# Patient Record
Sex: FEMALE | Race: WHITE | NOT HISPANIC OR LATINO | Employment: OTHER | URBAN - METROPOLITAN AREA
[De-identification: names, ages, dates, MRNs, and addresses within clinical notes are randomized per-mention and may not be internally consistent; named-entity substitution may affect disease eponyms.]

---

## 2023-09-20 PROBLEM — G90.50 COMPLEX REGIONAL PAIN SYNDROME TYPE 1: Status: ACTIVE | Noted: 2023-09-20

## 2023-09-20 PROBLEM — G89.29 CHRONIC PAIN: Status: ACTIVE | Noted: 2023-09-20

## 2023-09-20 PROBLEM — I82.409 DVT (DEEP VENOUS THROMBOSIS) (MULTI): Status: ACTIVE | Noted: 2023-09-20

## 2023-09-20 PROBLEM — Z15.01 BRCA1 GENE MUTATION POSITIVE: Status: ACTIVE | Noted: 2023-09-20

## 2023-09-20 PROBLEM — C50.912: Status: ACTIVE | Noted: 2023-09-20

## 2023-09-20 PROBLEM — E11.9 DIABETES MELLITUS, TYPE 2 (MULTI): Status: ACTIVE | Noted: 2023-09-20

## 2023-09-20 PROBLEM — F32.A DEPRESSION: Status: ACTIVE | Noted: 2023-09-20

## 2023-09-20 PROBLEM — G90.50 RSD (REFLEX SYMPATHETIC DYSTROPHY): Status: ACTIVE | Noted: 2023-09-20

## 2023-09-20 PROBLEM — K21.9 ACID REFLUX: Status: ACTIVE | Noted: 2023-09-20

## 2023-09-20 PROBLEM — E53.8 VITAMIN B 12 DEFICIENCY: Status: ACTIVE | Noted: 2023-09-20

## 2023-09-20 PROBLEM — M79.7 FIBROMYALGIA: Status: ACTIVE | Noted: 2023-09-20

## 2023-09-20 PROBLEM — M47.814 SPONDYLOSIS, THORACIC: Status: ACTIVE | Noted: 2023-09-20

## 2023-09-20 PROBLEM — R92.8 ABNORMAL MAMMOGRAM OF LEFT BREAST: Status: ACTIVE | Noted: 2023-09-20

## 2023-09-20 PROBLEM — G56.30 RADIAL NERVE PALSY: Status: ACTIVE | Noted: 2023-09-20

## 2023-09-20 PROBLEM — Z15.09 BRCA1 GENE MUTATION POSITIVE: Status: ACTIVE | Noted: 2023-09-20

## 2023-09-20 PROBLEM — R14.0 BLOATING SYMPTOM: Status: ACTIVE | Noted: 2023-09-20

## 2023-09-20 PROBLEM — R59.1 LYMPHADENOPATHY: Status: ACTIVE | Noted: 2023-09-20

## 2023-09-20 RX ORDER — INSULIN GLARGINE 100 [IU]/ML
INJECTION, SOLUTION SUBCUTANEOUS
Status: ON HOLD | COMMUNITY
End: 2023-10-13 | Stop reason: ALTCHOICE

## 2023-09-20 RX ORDER — ERGOCALCIFEROL 1.25 MG/1
1 CAPSULE ORAL
Status: ON HOLD | COMMUNITY
End: 2023-10-13 | Stop reason: ALTCHOICE

## 2023-09-20 RX ORDER — MULTIVITAMIN
TABLET ORAL
Status: ON HOLD | COMMUNITY
End: 2023-10-13 | Stop reason: ALTCHOICE

## 2023-09-20 RX ORDER — NALOXONE HYDROCHLORIDE 4 MG/.1ML
4 SPRAY NASAL AS NEEDED
COMMUNITY

## 2023-09-20 RX ORDER — ROSUVASTATIN CALCIUM 5 MG/1
1 TABLET, COATED ORAL DAILY
COMMUNITY

## 2023-09-20 RX ORDER — PROMETHAZINE HYDROCHLORIDE 25 MG/1
50 TABLET ORAL 3 TIMES DAILY PRN
COMMUNITY

## 2023-09-20 RX ORDER — ASCORBIC ACID 500 MG
1 TABLET ORAL DAILY
Status: ON HOLD | COMMUNITY
End: 2023-10-13 | Stop reason: ALTCHOICE

## 2023-09-20 RX ORDER — FAMOTIDINE 10 MG/1
TABLET ORAL
Status: ON HOLD | COMMUNITY
End: 2023-10-13 | Stop reason: ALTCHOICE

## 2023-09-20 RX ORDER — BLOOD SUGAR DIAGNOSTIC
STRIP MISCELLANEOUS 4 TIMES DAILY
Status: ON HOLD | COMMUNITY
End: 2023-10-13 | Stop reason: ALTCHOICE

## 2023-09-20 RX ORDER — GABAPENTIN 250 MG/5ML
15 SOLUTION ORAL 3 TIMES DAILY
COMMUNITY
End: 2024-03-27 | Stop reason: SDUPTHER

## 2023-09-20 RX ORDER — QUETIAPINE FUMARATE 200 MG/1
TABLET, FILM COATED ORAL
Status: ON HOLD | COMMUNITY
End: 2023-10-13 | Stop reason: ALTCHOICE

## 2023-09-20 RX ORDER — NITROGLYCERIN 0.4 MG/1
TABLET SUBLINGUAL
COMMUNITY

## 2023-09-20 RX ORDER — PROMETHAZINE HYDROCHLORIDE 50 MG/1
TABLET ORAL
Status: ON HOLD | COMMUNITY
End: 2023-10-13 | Stop reason: ALTCHOICE

## 2023-09-20 RX ORDER — VALACYCLOVIR HYDROCHLORIDE 500 MG/1
TABLET, FILM COATED ORAL
COMMUNITY

## 2023-09-20 RX ORDER — DOCUSATE SODIUM 100 MG/1
1 CAPSULE, LIQUID FILLED ORAL 2 TIMES DAILY PRN
COMMUNITY

## 2023-09-20 RX ORDER — ACETAMINOPHEN 160 MG/5ML
SUSPENSION, ORAL (FINAL DOSE FORM) ORAL
Status: ON HOLD | COMMUNITY
End: 2023-10-13 | Stop reason: ALTCHOICE

## 2023-09-20 RX ORDER — LANSOPRAZOLE 30 MG/1
CAPSULE, DELAYED RELEASE ORAL
Status: ON HOLD | COMMUNITY
End: 2023-10-13 | Stop reason: ALTCHOICE

## 2023-09-20 RX ORDER — DOCUSATE SODIUM 100 MG/1
1 CAPSULE, LIQUID FILLED ORAL 2 TIMES DAILY
Status: ON HOLD | COMMUNITY
End: 2023-10-13 | Stop reason: ALTCHOICE

## 2023-09-20 RX ORDER — LEVOTHYROXINE SODIUM 25 UG/1
TABLET ORAL
Status: ON HOLD | COMMUNITY
End: 2023-10-13 | Stop reason: ALTCHOICE

## 2023-09-20 RX ORDER — METHADONE HYDROCHLORIDE 5 MG/5ML
5 SOLUTION ORAL 3 TIMES DAILY
COMMUNITY
End: 2023-12-04 | Stop reason: SDUPTHER

## 2023-09-20 RX ORDER — BACLOFEN 10 MG/1
1 TABLET ORAL 3 TIMES DAILY
Status: ON HOLD | COMMUNITY
End: 2023-10-13 | Stop reason: ALTCHOICE

## 2023-09-20 RX ORDER — SUMATRIPTAN SUCCINATE 100 MG/1
100 TABLET ORAL ONCE AS NEEDED
COMMUNITY

## 2023-09-20 RX ORDER — ALBUTEROL SULFATE 90 UG/1
2 AEROSOL, METERED RESPIRATORY (INHALATION) EVERY 4 HOURS PRN
COMMUNITY

## 2023-09-20 RX ORDER — OLMESARTAN MEDOXOMIL 20 MG/1
20 TABLET ORAL DAILY
COMMUNITY

## 2023-09-20 RX ORDER — TOPIRAMATE 50 MG/1
1 TABLET, FILM COATED ORAL 2 TIMES DAILY
COMMUNITY
End: 2023-11-28

## 2023-09-20 RX ORDER — BACLOFEN 5 MG/1
TABLET ORAL
Status: ON HOLD | COMMUNITY
End: 2023-10-13 | Stop reason: ALTCHOICE

## 2023-09-20 RX ORDER — WARFARIN 7.5 MG/1
TABLET ORAL
Status: ON HOLD | COMMUNITY
End: 2023-10-13 | Stop reason: ALTCHOICE

## 2023-10-03 ENCOUNTER — CLINICAL SUPPORT (OUTPATIENT)
Dept: PAIN MEDICINE | Facility: CLINIC | Age: 58
End: 2023-10-03
Payer: MEDICARE

## 2023-10-03 VITALS
SYSTOLIC BLOOD PRESSURE: 136 MMHG | DIASTOLIC BLOOD PRESSURE: 99 MMHG | HEART RATE: 75 BPM | OXYGEN SATURATION: 95 % | RESPIRATION RATE: 20 BRPM | TEMPERATURE: 97.7 F

## 2023-10-03 DIAGNOSIS — M47.814 THORACIC SPONDYLOSIS WITHOUT MYELOPATHY: Primary | ICD-10-CM

## 2023-10-03 LAB
INR PPP: 1.1 (ref 0.9–1.1)
PROTHROMBIN TIME: 12.8 SECONDS (ref 9.8–12.8)

## 2023-10-03 PROCEDURE — 64491 INJ PARAVERT F JNT C/T 2 LEV: CPT | Performed by: PAIN MEDICINE

## 2023-10-03 PROCEDURE — 64492 INJ PARAVERT F JNT C/T 3 LEV: CPT | Mod: 50 | Performed by: PAIN MEDICINE

## 2023-10-03 PROCEDURE — 64490 INJ PARAVERT F JNT C/T 1 LEV: CPT | Performed by: PAIN MEDICINE

## 2023-10-03 PROCEDURE — 36415 COLL VENOUS BLD VENIPUNCTURE: CPT | Performed by: PAIN MEDICINE

## 2023-10-03 PROCEDURE — 64491 INJ PARAVERT F JNT C/T 2 LEV: CPT | Mod: 50 | Performed by: PAIN MEDICINE

## 2023-10-03 PROCEDURE — 85610 PROTHROMBIN TIME: CPT | Performed by: PAIN MEDICINE

## 2023-10-03 RX ORDER — LIDOCAINE HYDROCHLORIDE 20 MG/ML
10 INJECTION, SOLUTION INFILTRATION; PERINEURAL ONCE
Status: DISCONTINUED | OUTPATIENT
Start: 2023-10-03 | End: 2024-01-30 | Stop reason: HOSPADM

## 2023-10-03 RX ORDER — LIDOCAINE HYDROCHLORIDE 10 MG/ML
10 INJECTION INFILTRATION; PERINEURAL ONCE
Status: DISCONTINUED | OUTPATIENT
Start: 2023-10-03 | End: 2024-01-30 | Stop reason: HOSPADM

## 2023-10-03 ASSESSMENT — PAIN SCALES - GENERAL: PAINLEVEL_OUTOF10: 6

## 2023-10-03 ASSESSMENT — PAIN - FUNCTIONAL ASSESSMENT: PAIN_FUNCTIONAL_ASSESSMENT: 0-10

## 2023-10-03 NOTE — PROGRESS NOTES
Patient ID: Rut Tapia is a 57 y.o. female.    Procedures  Clinical Note  The patient is here today to receive diagnostic medial nerve branch block T10-T11, T11-T12 bilaterally to assist with pain.    Procedure Note  The patient was taken to the procedure room, was placed into a prone position. The area was prepped and draped sterilely in the normal fashion. The patient was throughout the procedure monitored by the nursing staff. The skin and subcutaneous tissue was anesthetized with 1% lidocaine. Then 22-gauge spinal needles were introduced into the approximation of the medial nerve branches at the above mentioned level, confirmed in AP and oblique view with negative aspiration of heme and CSF. The patient received 0.5 mL of 2% lidocaine per site. The patient tolerated the procedure well, was taken to the recovery room, allowed to recover for sufficient amount of time and then was discharged home in stable condition. The patient was advised to followup with the [facility] to reassess the improvement and determine the need for the radiofrequency ablation.    Thank you for allowing me to participate in the care of this patient.

## 2023-10-03 NOTE — PROGRESS NOTES
Subjective   Rut Tapia is a 57 y.o. female who presents for evaluation of her *** pain.  The pain is located in the *** area and radiates to ***.  The pain started *** ago and symptoms have been ***. She underwent ***. Symptoms interfere with {PAIN INTERFERES (Optional):10215} pain is described as {PAIN CHARACTER (Optional):55288} and is rated as {NUMBERS 1-10:97133} on a scale of 1-10. The pain is described as {PAIN ASSESSMENT (Optional):30118} with a score of {NUMBERS 1-10:28190} on her best day and a score of {NUMBERS 1-10:61884} on her worst day. Pain is relieved by ***. Prior pain treatment has included ***. Rut reports {GO[place] (Optional):28246} sleep habits and reports {NUMBERS 1-10:68731} hours of uninterrupted sleep per night. She {SLEEP (Optional):00797} and reports {MOOD (Optional):35696}. Imaging studies have included {IMAGING STUDIES (Optional):14125}.     Past Medical History:   Diagnosis Date    Chronic vascular disorders of intestine (CMS/HCC)     SMAS (superior mesenteric artery syndrome)    Narcolepsy without cataplexy     Narcolepsy    Personal history of other diseases of the nervous system and sense organs     History of cataract    Personal history of other infectious and parasitic diseases     History of staph infection    Personal history of other mental and behavioral disorders     History of dementia    Personal history of other venous thrombosis and embolism     History of deep venous thrombosis    Personal history of urinary calculi     History of kidney stones    Type 2 diabetes mellitus with diabetic neuropathy, unspecified (CMS/HCC)     Diabetes mellitus with diabetic neuropathy     Past Surgical History:   Procedure Laterality Date    APPENDECTOMY  2022    Appendectomy     SECTION, CLASSIC  2013     Section    CHOLECYSTECTOMY  2022    Cholecystectomy    GASTRIC BYPASS  2013    Gastric Surgery For Morbid Obesity Gastric Bypass     HYSTERECTOMY  2022    Hysterectomy    OOPHORECTOMY  2013    Oophorectomy    OTHER SURGICAL HISTORY  2022    Hernia repair    OTHER SURGICAL HISTORY  10/14/2022    Dilation and curettage    OTHER SURGICAL HISTORY  10/14/2022    Laminectomy    OTHER SURGICAL HISTORY  10/14/2022    Spinal cord stimulation    OTHER SURGICAL HISTORY  10/14/2022    Liver biopsy    OTHER SURGICAL HISTORY  10/14/2022    Cardiac catheterization    OTHER SURGICAL HISTORY  2022    Mastectomy bilateral    OTHER SURGICAL HISTORY  2022     section    OTHER SURGICAL HISTORY  2022    Gastric bypass surgery    OTHER SURGICAL HISTORY  2021    Breast biopsy    OTHER SURGICAL HISTORY  2021    Lymphatic Surgery    OTHER SURGICAL HISTORY  2019    Hysterectomy    OTHER SURGICAL HISTORY  2019    Cholecystectomy    OTHER SURGICAL HISTORY  2019    Knee surgery       I have reviewed the nurses notes and am aware of family/social history.     Review of Systems    Objective   Physical Exam    ASSESSMENT: ***    PLAN: ***  Discussed treatment plan with patient.   Call or return to clinic prn if these symptoms worsen or fail to improve as anticipated.     Yani Orozco RN

## 2023-10-05 ENCOUNTER — TELEPHONE (OUTPATIENT)
Dept: PAIN MEDICINE | Facility: CLINIC | Age: 58
End: 2023-10-05
Payer: MEDICARE

## 2023-10-07 ENCOUNTER — APPOINTMENT (OUTPATIENT)
Dept: RADIOLOGY | Facility: HOSPITAL | Age: 58
DRG: 603 | End: 2023-10-07
Payer: MEDICARE

## 2023-10-07 ENCOUNTER — HOSPITAL ENCOUNTER (INPATIENT)
Facility: HOSPITAL | Age: 58
LOS: 2 days | Discharge: HOME HEALTH | DRG: 603 | End: 2023-10-14
Attending: STUDENT IN AN ORGANIZED HEALTH CARE EDUCATION/TRAINING PROGRAM
Payer: MEDICARE
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
## 2023-10-07 DIAGNOSIS — S81.852A CAT BITE OF LEFT LOWER LEG, INITIAL ENCOUNTER: ICD-10-CM

## 2023-10-07 DIAGNOSIS — M79.89 SWELLING OF LOWER LEG: ICD-10-CM

## 2023-10-07 DIAGNOSIS — R59.1 LYMPHADENOPATHY: ICD-10-CM

## 2023-10-07 DIAGNOSIS — L03.116 CELLULITIS OF LEFT LOWER EXTREMITY: ICD-10-CM

## 2023-10-07 LAB
ALBUMIN SERPL BCP-MCNC: 4.2 G/DL (ref 3.4–5)
ALP SERPL-CCNC: 93 U/L (ref 33–110)
ALT SERPL W P-5'-P-CCNC: 15 U/L (ref 7–45)
ANION GAP SERPL CALC-SCNC: 12 MMOL/L (ref 10–20)
AST SERPL W P-5'-P-CCNC: 15 U/L (ref 9–39)
BASOPHILS # BLD AUTO: 0.02 X10*3/UL (ref 0–0.1)
BASOPHILS NFR BLD AUTO: 0.3 %
BILIRUB SERPL-MCNC: 0.5 MG/DL (ref 0–1.2)
BUN SERPL-MCNC: 20 MG/DL (ref 6–23)
CALCIUM SERPL-MCNC: 9.2 MG/DL (ref 8.6–10.3)
CHLORIDE SERPL-SCNC: 105 MMOL/L (ref 98–107)
CO2 SERPL-SCNC: 25 MMOL/L (ref 21–32)
CREAT SERPL-MCNC: 0.88 MG/DL (ref 0.5–1.05)
EOSINOPHIL # BLD AUTO: 0.16 X10*3/UL (ref 0–0.7)
EOSINOPHIL NFR BLD AUTO: 2.4 %
ERYTHROCYTE [DISTWIDTH] IN BLOOD BY AUTOMATED COUNT: 15 % (ref 11.5–14.5)
GFR SERPL CREATININE-BSD FRML MDRD: 77 ML/MIN/1.73M*2
GLUCOSE BLD MANUAL STRIP-MCNC: 138 MG/DL (ref 74–99)
GLUCOSE SERPL-MCNC: 141 MG/DL (ref 74–99)
HCT VFR BLD AUTO: 36 % (ref 36–46)
HGB BLD-MCNC: 11 G/DL (ref 12–16)
IMM GRANULOCYTES # BLD AUTO: 0.01 X10*3/UL (ref 0–0.7)
IMM GRANULOCYTES NFR BLD AUTO: 0.2 % (ref 0–0.9)
LACTATE SERPL-SCNC: 0.7 MMOL/L (ref 0.4–2)
LYMPHOCYTES # BLD AUTO: 1.98 X10*3/UL (ref 1.2–4.8)
LYMPHOCYTES NFR BLD AUTO: 29.8 %
MCH RBC QN AUTO: 26.9 PG (ref 26–34)
MCHC RBC AUTO-ENTMCNC: 30.6 G/DL (ref 32–36)
MCV RBC AUTO: 88 FL (ref 80–100)
MONOCYTES # BLD AUTO: 0.39 X10*3/UL (ref 0.1–1)
MONOCYTES NFR BLD AUTO: 5.9 %
NEUTROPHILS # BLD AUTO: 4.08 X10*3/UL (ref 1.2–7.7)
NEUTROPHILS NFR BLD AUTO: 61.4 %
NRBC BLD-RTO: 0 /100 WBCS (ref 0–0)
PLATELET # BLD AUTO: 216 X10*3/UL (ref 150–450)
PMV BLD AUTO: 10 FL (ref 7.5–11.5)
POTASSIUM SERPL-SCNC: 3.7 MMOL/L (ref 3.5–5.3)
PROT SERPL-MCNC: 6.9 G/DL (ref 6.4–8.2)
RBC # BLD AUTO: 4.09 X10*6/UL (ref 4–5.2)
SODIUM SERPL-SCNC: 138 MMOL/L (ref 136–145)
WBC # BLD AUTO: 6.6 X10*3/UL (ref 4.4–11.3)

## 2023-10-07 PROCEDURE — G0378 HOSPITAL OBSERVATION PER HR: HCPCS

## 2023-10-07 PROCEDURE — 87040 BLOOD CULTURE FOR BACTERIA: CPT | Mod: CMCLAB,PARLAB | Performed by: STUDENT IN AN ORGANIZED HEALTH CARE EDUCATION/TRAINING PROGRAM

## 2023-10-07 PROCEDURE — 85025 COMPLETE CBC W/AUTO DIFF WBC: CPT | Performed by: STUDENT IN AN ORGANIZED HEALTH CARE EDUCATION/TRAINING PROGRAM

## 2023-10-07 PROCEDURE — 73590 X-RAY EXAM OF LOWER LEG: CPT | Mod: LT,FY,FR

## 2023-10-07 PROCEDURE — 36415 COLL VENOUS BLD VENIPUNCTURE: CPT | Performed by: STUDENT IN AN ORGANIZED HEALTH CARE EDUCATION/TRAINING PROGRAM

## 2023-10-07 PROCEDURE — 99285 EMERGENCY DEPT VISIT HI MDM: CPT | Mod: 25 | Performed by: STUDENT IN AN ORGANIZED HEALTH CARE EDUCATION/TRAINING PROGRAM

## 2023-10-07 PROCEDURE — 96365 THER/PROPH/DIAG IV INF INIT: CPT

## 2023-10-07 PROCEDURE — 82947 ASSAY GLUCOSE BLOOD QUANT: CPT

## 2023-10-07 PROCEDURE — 2500000004 HC RX 250 GENERAL PHARMACY W/ HCPCS (ALT 636 FOR OP/ED): Performed by: STUDENT IN AN ORGANIZED HEALTH CARE EDUCATION/TRAINING PROGRAM

## 2023-10-07 PROCEDURE — 87075 CULTR BACTERIA EXCEPT BLOOD: CPT | Mod: CMCLAB,PARLAB | Performed by: STUDENT IN AN ORGANIZED HEALTH CARE EDUCATION/TRAINING PROGRAM

## 2023-10-07 PROCEDURE — 73590 X-RAY EXAM OF LOWER LEG: CPT | Mod: LEFT SIDE | Performed by: RADIOLOGY

## 2023-10-07 PROCEDURE — 80053 COMPREHEN METABOLIC PANEL: CPT | Performed by: STUDENT IN AN ORGANIZED HEALTH CARE EDUCATION/TRAINING PROGRAM

## 2023-10-07 PROCEDURE — 83605 ASSAY OF LACTIC ACID: CPT | Performed by: STUDENT IN AN ORGANIZED HEALTH CARE EDUCATION/TRAINING PROGRAM

## 2023-10-07 RX ORDER — METRONIDAZOLE 500 MG/100ML
500 INJECTION, SOLUTION INTRAVENOUS ONCE
Status: COMPLETED | OUTPATIENT
Start: 2023-10-07 | End: 2023-10-07

## 2023-10-07 RX ORDER — CEFEPIME 1 G/50ML
2 INJECTION, SOLUTION INTRAVENOUS ONCE
Status: COMPLETED | OUTPATIENT
Start: 2023-10-07 | End: 2023-10-07

## 2023-10-07 RX ADMIN — VANCOMYCIN HYDROCHLORIDE 1750 MG: 10 INJECTION, POWDER, LYOPHILIZED, FOR SOLUTION INTRAVENOUS at 22:43

## 2023-10-07 RX ADMIN — CEFEPIME 2 G: 1 INJECTION, SOLUTION INTRAVENOUS at 21:38

## 2023-10-07 RX ADMIN — METRONIDAZOLE 500 MG: 500 INJECTION, SOLUTION INTRAVENOUS at 22:11

## 2023-10-07 ASSESSMENT — LIFESTYLE VARIABLES
HAVE PEOPLE ANNOYED YOU BY CRITICIZING YOUR DRINKING: NO
HAVE YOU EVER FELT YOU SHOULD CUT DOWN ON YOUR DRINKING: NO
EVER FELT BAD OR GUILTY ABOUT YOUR DRINKING: NO
EVER HAD A DRINK FIRST THING IN THE MORNING TO STEADY YOUR NERVES TO GET RID OF A HANGOVER: NO

## 2023-10-07 ASSESSMENT — PAIN SCALES - GENERAL: PAINLEVEL_OUTOF10: 7

## 2023-10-07 ASSESSMENT — COLUMBIA-SUICIDE SEVERITY RATING SCALE - C-SSRS
2. HAVE YOU ACTUALLY HAD ANY THOUGHTS OF KILLING YOURSELF?: NO
6. HAVE YOU EVER DONE ANYTHING, STARTED TO DO ANYTHING, OR PREPARED TO DO ANYTHING TO END YOUR LIFE?: NO
1. IN THE PAST MONTH, HAVE YOU WISHED YOU WERE DEAD OR WISHED YOU COULD GO TO SLEEP AND NOT WAKE UP?: NO

## 2023-10-07 ASSESSMENT — PAIN - FUNCTIONAL ASSESSMENT: PAIN_FUNCTIONAL_ASSESSMENT: 0-10

## 2023-10-08 LAB
ALBUMIN SERPL BCP-MCNC: 4.1 G/DL (ref 3.4–5)
ALP SERPL-CCNC: 88 U/L (ref 33–110)
ALT SERPL W P-5'-P-CCNC: 14 U/L (ref 7–45)
ANION GAP SERPL CALC-SCNC: 11 MMOL/L (ref 10–20)
APPEARANCE UR: CLEAR
AST SERPL W P-5'-P-CCNC: 15 U/L (ref 9–39)
BILIRUB SERPL-MCNC: 0.3 MG/DL (ref 0–1.2)
BILIRUB UR STRIP.AUTO-MCNC: NEGATIVE MG/DL
BUN SERPL-MCNC: 16 MG/DL (ref 6–23)
CALCIUM SERPL-MCNC: 8.8 MG/DL (ref 8.6–10.3)
CHLORIDE SERPL-SCNC: 108 MMOL/L (ref 98–107)
CO2 SERPL-SCNC: 25 MMOL/L (ref 21–32)
COLOR UR: YELLOW
CREAT SERPL-MCNC: 0.89 MG/DL (ref 0.5–1.05)
CRP SERPL-MCNC: 3.39 MG/DL
ERYTHROCYTE [SEDIMENTATION RATE] IN BLOOD BY WESTERGREN METHOD: 34 MM/H (ref 0–30)
GFR SERPL CREATININE-BSD FRML MDRD: 76 ML/MIN/1.73M*2
GLUCOSE BLD MANUAL STRIP-MCNC: 121 MG/DL (ref 74–99)
GLUCOSE SERPL-MCNC: 119 MG/DL (ref 74–99)
GLUCOSE UR STRIP.AUTO-MCNC: NEGATIVE MG/DL
INR PPP: 1.4 (ref 0.9–1.1)
KETONES UR STRIP.AUTO-MCNC: NEGATIVE MG/DL
LEUKOCYTE ESTERASE UR QL STRIP.AUTO: NEGATIVE
NITRITE UR QL STRIP.AUTO: NEGATIVE
PH UR STRIP.AUTO: 5 [PH]
POTASSIUM SERPL-SCNC: 4.2 MMOL/L (ref 3.5–5.3)
PROT SERPL-MCNC: 6.6 G/DL (ref 6.4–8.2)
PROT UR STRIP.AUTO-MCNC: NEGATIVE MG/DL
PROTHROMBIN TIME: 16.1 SECONDS (ref 9.8–12.8)
RBC # UR STRIP.AUTO: NEGATIVE /UL
SODIUM SERPL-SCNC: 140 MMOL/L (ref 136–145)
SP GR UR STRIP.AUTO: 1.01
UROBILINOGEN UR STRIP.AUTO-MCNC: <2 MG/DL
VANCOMYCIN TROUGH SERPL-MCNC: 12.6 UG/ML (ref 5–20)

## 2023-10-08 PROCEDURE — 86140 C-REACTIVE PROTEIN: CPT | Performed by: INTERNAL MEDICINE

## 2023-10-08 PROCEDURE — 36415 COLL VENOUS BLD VENIPUNCTURE: CPT | Performed by: INTERNAL MEDICINE

## 2023-10-08 PROCEDURE — 99222 1ST HOSP IP/OBS MODERATE 55: CPT | Performed by: NURSE PRACTITIONER

## 2023-10-08 PROCEDURE — 85610 PROTHROMBIN TIME: CPT | Performed by: INTERNAL MEDICINE

## 2023-10-08 PROCEDURE — 99222 1ST HOSP IP/OBS MODERATE 55: CPT | Performed by: INTERNAL MEDICINE

## 2023-10-08 PROCEDURE — 80202 ASSAY OF VANCOMYCIN: CPT | Performed by: INTERNAL MEDICINE

## 2023-10-08 PROCEDURE — 2500000004 HC RX 250 GENERAL PHARMACY W/ HCPCS (ALT 636 FOR OP/ED): Performed by: INTERNAL MEDICINE

## 2023-10-08 PROCEDURE — 96372 THER/PROPH/DIAG INJ SC/IM: CPT | Performed by: INTERNAL MEDICINE

## 2023-10-08 PROCEDURE — 81003 URINALYSIS AUTO W/O SCOPE: CPT | Performed by: INTERNAL MEDICINE

## 2023-10-08 PROCEDURE — G0378 HOSPITAL OBSERVATION PER HR: HCPCS

## 2023-10-08 PROCEDURE — 80053 COMPREHEN METABOLIC PANEL: CPT | Performed by: INTERNAL MEDICINE

## 2023-10-08 PROCEDURE — 82947 ASSAY GLUCOSE BLOOD QUANT: CPT

## 2023-10-08 PROCEDURE — 2500000001 HC RX 250 WO HCPCS SELF ADMINISTERED DRUGS (ALT 637 FOR MEDICARE OP): Performed by: INTERNAL MEDICINE

## 2023-10-08 PROCEDURE — 85652 RBC SED RATE AUTOMATED: CPT | Performed by: INTERNAL MEDICINE

## 2023-10-08 RX ORDER — DEXTROSE 50 % IN WATER (D50W) INTRAVENOUS SYRINGE
25
Status: DISCONTINUED | OUTPATIENT
Start: 2023-10-08 | End: 2023-10-15 | Stop reason: HOSPADM

## 2023-10-08 RX ORDER — DOCUSATE SODIUM 100 MG/1
100 CAPSULE, LIQUID FILLED ORAL 2 TIMES DAILY
Status: DISCONTINUED | OUTPATIENT
Start: 2023-10-08 | End: 2023-10-15 | Stop reason: HOSPADM

## 2023-10-08 RX ORDER — DEXTROSE MONOHYDRATE 100 MG/ML
0.3 INJECTION, SOLUTION INTRAVENOUS ONCE AS NEEDED
Status: DISCONTINUED | OUTPATIENT
Start: 2023-10-08 | End: 2023-10-15 | Stop reason: HOSPADM

## 2023-10-08 RX ORDER — BISMUTH SUBSALICYLATE 262 MG
1 TABLET,CHEWABLE ORAL DAILY
Status: DISCONTINUED | OUTPATIENT
Start: 2023-10-08 | End: 2023-10-13

## 2023-10-08 RX ORDER — QUETIAPINE FUMARATE 100 MG/1
200 TABLET, FILM COATED ORAL 2 TIMES DAILY
Status: DISCONTINUED | OUTPATIENT
Start: 2023-10-08 | End: 2023-10-08

## 2023-10-08 RX ORDER — ACETAMINOPHEN 325 MG/1
650 TABLET ORAL EVERY 6 HOURS PRN
Status: DISCONTINUED | OUTPATIENT
Start: 2023-10-08 | End: 2023-10-15 | Stop reason: HOSPADM

## 2023-10-08 RX ORDER — MORPHINE SULFATE 2 MG/ML
2 INJECTION, SOLUTION INTRAMUSCULAR; INTRAVENOUS EVERY 4 HOURS PRN
Status: DISCONTINUED | OUTPATIENT
Start: 2023-10-08 | End: 2023-10-15 | Stop reason: HOSPADM

## 2023-10-08 RX ORDER — CALCIUM CARBONATE 200(500)MG
500 TABLET,CHEWABLE ORAL DAILY
Status: DISCONTINUED | OUTPATIENT
Start: 2023-10-08 | End: 2023-10-15 | Stop reason: HOSPADM

## 2023-10-08 RX ORDER — FAMOTIDINE 20 MG/1
20 TABLET, FILM COATED ORAL 2 TIMES DAILY
Status: DISCONTINUED | OUTPATIENT
Start: 2023-10-08 | End: 2023-10-15 | Stop reason: HOSPADM

## 2023-10-08 RX ORDER — PROMETHAZINE HYDROCHLORIDE 25 MG/1
25 TABLET ORAL 2 TIMES DAILY PRN
Status: DISCONTINUED | OUTPATIENT
Start: 2023-10-08 | End: 2023-10-15 | Stop reason: HOSPADM

## 2023-10-08 RX ORDER — QUETIAPINE FUMARATE 100 MG/1
200 TABLET, FILM COATED ORAL NIGHTLY
Status: DISCONTINUED | OUTPATIENT
Start: 2023-10-08 | End: 2023-10-15 | Stop reason: HOSPADM

## 2023-10-08 RX ORDER — VANCOMYCIN HYDROCHLORIDE 750 MG/150ML
750 INJECTION, SOLUTION INTRAVENOUS EVERY 12 HOURS
Status: DISCONTINUED | OUTPATIENT
Start: 2023-10-08 | End: 2023-10-10

## 2023-10-08 RX ORDER — ENOXAPARIN SODIUM 100 MG/ML
40 INJECTION SUBCUTANEOUS EVERY 12 HOURS SCHEDULED
Status: DISCONTINUED | OUTPATIENT
Start: 2023-10-08 | End: 2023-10-12

## 2023-10-08 RX ORDER — GABAPENTIN 250 MG/5ML
750 SOLUTION ORAL EVERY 8 HOURS SCHEDULED
Status: DISCONTINUED | OUTPATIENT
Start: 2023-10-08 | End: 2023-10-15 | Stop reason: HOSPADM

## 2023-10-08 RX ORDER — CEFEPIME 1 G/50ML
2 INJECTION, SOLUTION INTRAVENOUS EVERY 8 HOURS
Status: DISCONTINUED | OUTPATIENT
Start: 2023-10-08 | End: 2023-10-10

## 2023-10-08 RX ORDER — LANOLIN ALCOHOL/MO/W.PET/CERES
500 CREAM (GRAM) TOPICAL DAILY
Status: DISCONTINUED | OUTPATIENT
Start: 2023-10-08 | End: 2023-10-15 | Stop reason: HOSPADM

## 2023-10-08 RX ORDER — NITROGLYCERIN 0.4 MG/1
0.4 TABLET SUBLINGUAL EVERY 5 MIN PRN
Status: DISCONTINUED | OUTPATIENT
Start: 2023-10-08 | End: 2023-10-15 | Stop reason: HOSPADM

## 2023-10-08 RX ORDER — ARIPIPRAZOLE 2 MG/1
2 TABLET ORAL DAILY
Status: DISCONTINUED | OUTPATIENT
Start: 2023-10-08 | End: 2023-10-15 | Stop reason: HOSPADM

## 2023-10-08 RX ORDER — METRONIDAZOLE 500 MG/100ML
500 INJECTION, SOLUTION INTRAVENOUS EVERY 8 HOURS
Status: DISCONTINUED | OUTPATIENT
Start: 2023-10-08 | End: 2023-10-12

## 2023-10-08 RX ORDER — BACLOFEN 10 MG/1
10 TABLET ORAL 3 TIMES DAILY
Status: DISCONTINUED | OUTPATIENT
Start: 2023-10-08 | End: 2023-10-15 | Stop reason: HOSPADM

## 2023-10-08 RX ORDER — ASCORBIC ACID 250 MG
500 TABLET ORAL DAILY
Status: DISCONTINUED | OUTPATIENT
Start: 2023-10-08 | End: 2023-10-15 | Stop reason: HOSPADM

## 2023-10-08 RX ORDER — INSULIN LISPRO 100 [IU]/ML
0-5 INJECTION, SOLUTION INTRAVENOUS; SUBCUTANEOUS
Status: DISCONTINUED | OUTPATIENT
Start: 2023-10-09 | End: 2023-10-15 | Stop reason: HOSPADM

## 2023-10-08 RX ADMIN — ARIPIPRAZOLE 2 MG: 2 TABLET ORAL at 11:57

## 2023-10-08 RX ADMIN — ENOXAPARIN SODIUM 40 MG: 40 INJECTION SUBCUTANEOUS at 09:09

## 2023-10-08 RX ADMIN — GABAPENTIN 750 MG: 250 SOLUTION ORAL at 15:12

## 2023-10-08 RX ADMIN — MORPHINE SULFATE 2 MG: 2 INJECTION, SOLUTION INTRAMUSCULAR; INTRAVENOUS at 05:50

## 2023-10-08 RX ADMIN — VANCOMYCIN HYDROCHLORIDE 750 MG: 750 INJECTION, SOLUTION INTRAVENOUS at 10:37

## 2023-10-08 RX ADMIN — BACLOFEN 10 MG: 10 TABLET ORAL at 21:22

## 2023-10-08 RX ADMIN — CEFEPIME 2 G: 1 INJECTION, SOLUTION INTRAVENOUS at 06:56

## 2023-10-08 RX ADMIN — MORPHINE SULFATE 2 MG: 2 INJECTION, SOLUTION INTRAMUSCULAR; INTRAVENOUS at 13:16

## 2023-10-08 RX ADMIN — BACLOFEN 10 MG: 10 TABLET ORAL at 10:39

## 2023-10-08 RX ADMIN — METRONIDAZOLE 500 MG: 500 INJECTION, SOLUTION INTRAVENOUS at 15:17

## 2023-10-08 RX ADMIN — METRONIDAZOLE 500 MG: 500 INJECTION, SOLUTION INTRAVENOUS at 05:50

## 2023-10-08 RX ADMIN — ACETAMINOPHEN 650 MG: 325 TABLET ORAL at 01:29

## 2023-10-08 RX ADMIN — QUETIAPINE FUMARATE 200 MG: 100 TABLET ORAL at 21:22

## 2023-10-08 RX ADMIN — PROMETHAZINE HYDROCHLORIDE 25 MG: 25 TABLET ORAL at 10:34

## 2023-10-08 RX ADMIN — FAMOTIDINE 20 MG: 20 TABLET, FILM COATED ORAL at 21:23

## 2023-10-08 RX ADMIN — ENOXAPARIN SODIUM 40 MG: 40 INJECTION SUBCUTANEOUS at 21:22

## 2023-10-08 RX ADMIN — CEFEPIME 2 G: 1 INJECTION, SOLUTION INTRAVENOUS at 22:41

## 2023-10-08 RX ADMIN — BACLOFEN 10 MG: 10 TABLET ORAL at 15:15

## 2023-10-08 RX ADMIN — VANCOMYCIN HYDROCHLORIDE 750 MG: 750 INJECTION, SOLUTION INTRAVENOUS at 23:28

## 2023-10-08 RX ADMIN — WARFARIN SODIUM 7.5 MG: 5 TABLET ORAL at 17:50

## 2023-10-08 RX ADMIN — THIAMINE HCL TAB 100 MG 500 MG: 100 TAB at 10:36

## 2023-10-08 RX ADMIN — CEFEPIME 2 G: 1 INJECTION, SOLUTION INTRAVENOUS at 13:16

## 2023-10-08 RX ADMIN — GABAPENTIN 750 MG: 250 SOLUTION ORAL at 21:33

## 2023-10-08 RX ADMIN — MORPHINE SULFATE 2 MG: 2 INJECTION, SOLUTION INTRAMUSCULAR; INTRAVENOUS at 22:48

## 2023-10-08 RX ADMIN — FAMOTIDINE 20 MG: 20 TABLET, FILM COATED ORAL at 10:33

## 2023-10-08 RX ADMIN — METRONIDAZOLE 500 MG: 500 INJECTION, SOLUTION INTRAVENOUS at 21:39

## 2023-10-08 SDOH — ECONOMIC STABILITY: HOUSING INSECURITY

## 2023-10-08 SDOH — ECONOMIC STABILITY: TRANSPORTATION INSECURITY
IN THE PAST 12 MONTHS, HAS THE LACK OF TRANSPORTATION KEPT YOU FROM MEDICAL APPOINTMENTS OR FROM GETTING MEDICATIONS?: PATIENT DECLINED

## 2023-10-08 SDOH — ECONOMIC STABILITY: FOOD INSECURITY: WITHIN THE PAST 12 MONTHS, YOU WORRIED THAT YOUR FOOD WOULD RUN OUT BEFORE YOU GOT MONEY TO BUY MORE.: PATIENT DECLINED

## 2023-10-08 SDOH — SOCIAL STABILITY: SOCIAL INSECURITY: ARE THERE ANY APPARENT SIGNS OF INJURIES/BEHAVIORS THAT COULD BE RELATED TO ABUSE/NEGLECT?: NO

## 2023-10-08 SDOH — ECONOMIC STABILITY: FOOD INSECURITY

## 2023-10-08 SDOH — ECONOMIC STABILITY: HOUSING INSECURITY
IN THE LAST 12 MONTHS, WAS THERE A TIME WHEN YOU DID NOT HAVE A STEADY PLACE TO SLEEP OR SLEPT IN A SHELTER (INCLUDING NOW)?: NO

## 2023-10-08 SDOH — SOCIAL STABILITY: SOCIAL INSECURITY: ARE YOU OR HAVE YOU BEEN THREATENED OR ABUSED PHYSICALLY, EMOTIONALLY, OR SEXUALLY BY ANYONE?: NO

## 2023-10-08 SDOH — ECONOMIC STABILITY: HOUSING INSECURITY: IN THE LAST 12 MONTHS, WAS THERE A TIME WHEN YOU WERE NOT ABLE TO PAY THE MORTGAGE OR RENT ON TIME?: PATIENT REFUSED

## 2023-10-08 SDOH — ECONOMIC STABILITY: INCOME INSECURITY: IN THE LAST 12 MONTHS, WAS THERE A TIME WHEN YOU WERE NOT ABLE TO PAY THE MORTGAGE OR RENT ON TIME?: NO

## 2023-10-08 SDOH — ECONOMIC STABILITY: TRANSPORTATION INSECURITY
IN THE PAST 12 MONTHS, HAS LACK OF TRANSPORTATION KEPT YOU FROM MEETINGS, WORK, OR FROM GETTING THINGS NEEDED FOR DAILY LIVING?: PATIENT DECLINED

## 2023-10-08 SDOH — ECONOMIC STABILITY: FOOD INSECURITY: WITHIN THE PAST 12 MONTHS, THE FOOD YOU BOUGHT JUST DIDN'T LAST AND YOU DIDN'T HAVE MONEY TO GET MORE.: PATIENT DECLINED

## 2023-10-08 SDOH — ECONOMIC STABILITY: FOOD INSECURITY
WITHIN THE PAST 12 MONTHS, YOU WORRIED THAT YOUR FOOD WOULD RUN OUT BEFORE YOU GOT THE MONEY TO BUY MORE.: PATIENT DECLINED

## 2023-10-08 SDOH — ECONOMIC STABILITY: FOOD INSECURITY: WITHIN THE PAST 12 MONTHS, THE FOOD YOU BOUGHT JUST DIDN'T LAST AND YOU DIDN'T HAVE MONEY TO GET MORE.: NEVER TRUE

## 2023-10-08 SDOH — ECONOMIC STABILITY: TRANSPORTATION INSECURITY
IN THE PAST 12 MONTHS, HAS LACK OF TRANSPORTATION KEPT YOU FROM MEETINGS, WORK, OR FROM GETTING THINGS NEEDED FOR DAILY LIVING?: YES

## 2023-10-08 SDOH — SOCIAL STABILITY: SOCIAL INSECURITY: HAS ANYONE EVER THREATENED TO HURT YOUR FAMILY OR YOUR PETS?: NO

## 2023-10-08 SDOH — SOCIAL STABILITY: SOCIAL INSECURITY: HAVE YOU HAD THOUGHTS OF HARMING ANYONE ELSE?: NO

## 2023-10-08 SDOH — HEALTH STABILITY: PHYSICAL HEALTH: ON AVERAGE, HOW MANY DAYS PER WEEK DO YOU ENGAGE IN MODERATE TO STRENUOUS EXERCISE (LIKE A BRISK WALK)?: 7 DAYS

## 2023-10-08 SDOH — ECONOMIC STABILITY: TRANSPORTATION INSECURITY
IN THE PAST 12 MONTHS, HAS THE LACK OF TRANSPORTATION KEPT YOU FROM MEDICAL APPOINTMENTS OR FROM GETTING MEDICATIONS?: YES

## 2023-10-08 SDOH — HEALTH STABILITY: MENTAL HEALTH
STRESS IS WHEN SOMEONE FEELS TENSE, NERVOUS, ANXIOUS, OR CAN'T SLEEP AT NIGHT BECAUSE THEIR MIND IS TROUBLED. HOW STRESSED ARE YOU?: TO SOME EXTENT

## 2023-10-08 SDOH — ECONOMIC STABILITY: TRANSPORTATION INSECURITY: IN THE PAST 12 MONTHS, HAS LACK OF TRANSPORTATION KEPT YOU FROM MEDICAL APPOINTMENTS OR FROM GETTING MEDICATIONS?: YES

## 2023-10-08 SDOH — ECONOMIC STABILITY: FOOD INSECURITY: WITHIN THE PAST 12 MONTHS, YOU WORRIED THAT YOUR FOOD WOULD RUN OUT BEFORE YOU GOT MONEY TO BUY MORE.: NEVER TRUE

## 2023-10-08 SDOH — ECONOMIC STABILITY: GENERAL

## 2023-10-08 SDOH — ECONOMIC STABILITY: INCOME INSECURITY: HOW HARD IS IT FOR YOU TO PAY FOR THE VERY BASICS LIKE FOOD, HOUSING, MEDICAL CARE, AND HEATING?: PATIENT DECLINED

## 2023-10-08 SDOH — SOCIAL STABILITY: SOCIAL INSECURITY: DO YOU FEEL UNSAFE GOING BACK TO THE PLACE WHERE YOU ARE LIVING?: NO

## 2023-10-08 SDOH — HEALTH STABILITY: PHYSICAL HEALTH: ON AVERAGE, HOW MANY MINUTES DO YOU ENGAGE IN EXERCISE AT THIS LEVEL?: 30 MIN

## 2023-10-08 SDOH — ECONOMIC STABILITY: HOUSING INSECURITY: IN THE PAST 12 MONTHS HAS THE ELECTRIC, GAS, OIL, OR WATER COMPANY THREATENED TO SHUT OFF SERVICES IN YOUR HOME?: NO

## 2023-10-08 SDOH — ECONOMIC STABILITY: INCOME INSECURITY: IN THE LAST 12 MONTHS, WAS THERE A TIME WHEN YOU WERE NOT ABLE TO PAY THE MORTGAGE OR RENT ON TIME?: PATIENT REFUSED

## 2023-10-08 SDOH — ECONOMIC STABILITY: FOOD INSECURITY: WITHIN THE PAST 12 MONTHS, THE FOOD YOU BOUGHT JUST DIDN’T LAST AND YOU DIDN’T HAVE MONEY TO GET MORE.: PATIENT DECLINED

## 2023-10-08 SDOH — SOCIAL STABILITY: SOCIAL INSECURITY: DO YOU FEEL ANYONE HAS EXPLOITED OR TAKEN ADVANTAGE OF YOU FINANCIALLY OR OF YOUR PERSONAL PROPERTY?: NO

## 2023-10-08 SDOH — SOCIAL STABILITY: SOCIAL INSECURITY: DOES ANYONE TRY TO KEEP YOU FROM HAVING/CONTACTING OTHER FRIENDS OR DOING THINGS OUTSIDE YOUR HOME?: NO

## 2023-10-08 SDOH — ECONOMIC STABILITY: TRANSPORTATION INSECURITY

## 2023-10-08 ASSESSMENT — COGNITIVE AND FUNCTIONAL STATUS - GENERAL
MOBILITY SCORE: 24
PATIENT BASELINE BEDBOUND: NO
DAILY ACTIVITIY SCORE: 24
MOBILITY SCORE: 24
DAILY ACTIVITIY SCORE: 24

## 2023-10-08 ASSESSMENT — ACTIVITIES OF DAILY LIVING (ADL)
FEEDING YOURSELF: INDEPENDENT
DRESSING YOURSELF: INDEPENDENT
ADEQUATE_TO_COMPLETE_ADL: YES
PATIENT'S MEMORY ADEQUATE TO SAFELY COMPLETE DAILY ACTIVITIES?: YES
LACK_OF_TRANSPORTATION: YES
WALKS IN HOME: INDEPENDENT
TOILETING: INDEPENDENT
GROOMING: INDEPENDENT
JUDGMENT_ADEQUATE_SAFELY_COMPLETE_DAILY_ACTIVITIES: YES
BATHING: INDEPENDENT
HEARING - LEFT EAR: FUNCTIONAL
HEARING - RIGHT EAR: FUNCTIONAL

## 2023-10-08 ASSESSMENT — LIFESTYLE VARIABLES
SKIP TO QUESTIONS 9-10: 1
PRESCIPTION_ABUSE_PAST_12_MONTHS: NO
HOW OFTEN DO YOU HAVE A DRINK CONTAINING ALCOHOL: NEVER
HOW MANY STANDARD DRINKS CONTAINING ALCOHOL DO YOU HAVE ON A TYPICAL DAY: PATIENT DOES NOT DRINK
AUDIT-C TOTAL SCORE: 0
HOW OFTEN DO YOU HAVE 6 OR MORE DRINKS ON ONE OCCASION: NEVER
AUDIT-C TOTAL SCORE: 0
SUBSTANCE_ABUSE_PAST_12_MONTHS: NO

## 2023-10-08 ASSESSMENT — PATIENT HEALTH QUESTIONNAIRE - PHQ9
2. FEELING DOWN, DEPRESSED OR HOPELESS: NEARLY EVERY DAY
SUM OF ALL RESPONSES TO PHQ9 QUESTIONS 1 & 2: 5
1. LITTLE INTEREST OR PLEASURE IN DOING THINGS: MORE THAN HALF THE DAYS

## 2023-10-08 ASSESSMENT — PAIN SCALES - GENERAL
PAINLEVEL_OUTOF10: 5 - MODERATE PAIN
PAINLEVEL_OUTOF10: 9
PAINLEVEL_OUTOF10: 8
PAINLEVEL_OUTOF10: 0 - NO PAIN
PAINLEVEL_OUTOF10: 0 - NO PAIN

## 2023-10-08 ASSESSMENT — PAIN - FUNCTIONAL ASSESSMENT
PAIN_FUNCTIONAL_ASSESSMENT: FLACC (FACE, LEGS, ACTIVITY, CRY, CONSOLABILITY)
PAIN_FUNCTIONAL_ASSESSMENT: 0-10
PAIN_FUNCTIONAL_ASSESSMENT: 0-10

## 2023-10-08 ASSESSMENT — SOCIAL DETERMINANTS OF HEALTH (SDOH): IN THE PAST 12 MONTHS, HAS THE ELECTRIC, GAS, OIL, OR WATER COMPANY THREATENED TO SHUT OFF SERVICE IN YOUR HOME?: NO

## 2023-10-08 NOTE — H&P
History Of Present Illness  Rut Tapia is a 57 y.o. female presenting to emergency department for evaluation of a cat bite to her left lower extremity that occurred on 10/2/2023.  Patient states that she sustained a cat bite from her own cat.  Patient states that she cleaned it out and it was doing fine until the last several days she noticed an increase in redness and swelling.  Patient denies chest pain or dizziness.  Patient denies shortness of breath, nausea, vomiting, diarrhea.  Patient denies fevers.  Patient states the cat is current on all of its vaccinations.  Patient does admit to a history of MRSA and the swelling prompted her to come to the emergency department for evaluation.    In ED, glucose 141, hemoglobin 11.0.  Blood cultures pending.  Patient started on IV vancomycin, cefepime, Flagyl IV due to antibiotic allergies.  PT/INR ordered.  Sed rate and CRP ordered and pending.  Blood pressure 134/61, heart rate 73, respirations 18, temperature 36.5 °C, SPO2 97% on room air.  Patient admitted to observation under the care of Dr. Nicolas Quintero will continue to follow.  I was asked to do H&P and place initial admission orders.     Past Medical History  Past Medical History:   Diagnosis Date    Arthritis     Chronic vascular disorders of intestine (CMS/[facility], located [facility])     SMAS (superior mesenteric artery syndrome)    Narcolepsy without cataplexy     Narcolepsy    Personal history of other diseases of the nervous system and sense organs     History of cataract    Personal history of other infectious and parasitic diseases     History of staph infection    Personal history of other mental and behavioral disorders     History of dementia    Personal history of other venous thrombosis and embolism     History of deep venous thrombosis    Personal history of urinary calculi     History of kidney stones    Type 2 diabetes mellitus with diabetic neuropathy, unspecified (CMS/HCC)     Diabetes mellitus with diabetic  neuropathy       Surgical History  Past Surgical History:   Procedure Laterality Date    APPENDECTOMY  2022    Appendectomy     SECTION, CLASSIC  2013     Section    CHOLECYSTECTOMY  2022    Cholecystectomy    GASTRIC BYPASS  2013    Gastric Surgery For Morbid Obesity Gastric Bypass    HYSTERECTOMY  2022    Hysterectomy    OOPHORECTOMY  2013    Oophorectomy    OTHER SURGICAL HISTORY  2022    Hernia repair    OTHER SURGICAL HISTORY  10/14/2022    Dilation and curettage    OTHER SURGICAL HISTORY  10/14/2022    Laminectomy    OTHER SURGICAL HISTORY  10/14/2022    Spinal cord stimulation    OTHER SURGICAL HISTORY  10/14/2022    Liver biopsy    OTHER SURGICAL HISTORY  10/14/2022    Cardiac catheterization    OTHER SURGICAL HISTORY  2022    Mastectomy bilateral    OTHER SURGICAL HISTORY  2022     section    OTHER SURGICAL HISTORY  2022    Gastric bypass surgery    OTHER SURGICAL HISTORY  2021    Breast biopsy    OTHER SURGICAL HISTORY  2021    Lymphatic Surgery    OTHER SURGICAL HISTORY  2019    Hysterectomy    OTHER SURGICAL HISTORY  2019    Cholecystectomy    OTHER SURGICAL HISTORY  2019    Knee surgery        Social History  She reports that she has never smoked. She has never been exposed to tobacco smoke. She has never used smokeless tobacco. She reports that she does not drink alcohol and does not use drugs.    Family History  Family History   Problem Relation Name Age of Onset    Other (blood pressure) Mother          isolated elevated    Other (blood pressure) Father          isolated elevated    Diabetes Father      Other (cardiac problems) Father          reported previous    Diabetes Sister      Ovarian cancer Sister      Other (malignant carcinoma) Sister          of the breast    Diabetes Brother      Sleep apnea Brother          nonorganic    Bipolar disorder Son      Schizophrenia Son       "Breast cancer Mother's Sister      Breast cancer Maternal Grandmother      Colon cancer Maternal Grandfather      Breast cancer Maternal Great-Grandmother          Allergies  Aspirin; Buprenorphine; Citalopram; Meperidine; Nsaids (non-steroidal anti-inflammatory drug); Penicillins; Prochlorperazine; Abilify [aripiprazole]; Amitriptyline; Azithromycin; Methylprednisolone; Petrolatum,white; and Tobramycin-dexamethasone    Review of Systems  A 10 point review of systems was completed and negative except what is listed in HPI  Physical Exam  Constitutional:       Appearance: She is obese.   HENT:      Nose: Nose normal.      Mouth/Throat:      Mouth: Mucous membranes are moist.      Pharynx: Oropharynx is clear.   Eyes:      Pupils: Pupils are equal, round, and reactive to light.   Cardiovascular:      Rate and Rhythm: Normal rate and regular rhythm.   Pulmonary:      Effort: Pulmonary effort is normal.   Abdominal:      General: Bowel sounds are normal.   Musculoskeletal:      Cervical back: Normal range of motion.      Right lower leg: Edema present.      Comments: Erythema to anterior left shin   Skin:     General: Skin is warm.      Capillary Refill: Capillary refill takes less than 2 seconds.   Neurological:      Mental Status: She is alert.   Psychiatric:         Mood and Affect: Mood normal.          Last Recorded Vitals  Blood pressure (!) 96/49, pulse 55, temperature 36.1 °C (97 °F), resp. rate 20, height 1.6 m (5' 3\"), weight 118 kg (259 lb 14.8 oz), SpO2 97 %.    Relevant Results  XR tibia fibula left 2 views    Result Date: 10/7/2023  STUDY: Tibia and Fibula Radiographs; 10/07/2023 9:14 pm INDICATION: Cat bite. COMPARISON: None Available. ACCESSION NUMBER(S): [id number] ORDERING CLINICIAN: JERRY SMITH TECHNIQUE:  Two view(s) of the left tibia and fibula. FINDINGS:  There is no displaced fracture.  Moderate osteoarthritis is demonstrated the knee joint.  There are no erosive changes.  No soft tissue " mass or any soft tissue gas.    Moderate osteoarthritis knee. Signed by Andrez Patrick, DO      Results for orders placed or performed during the hospital encounter of 10/07/23 (from the past 24 hour(s))   CBC and Auto Differential   Result Value Ref Range    WBC 6.6 4.4 - 11.3 x10*3/uL    nRBC 0.0 0.0 - 0.0 /100 WBCs    RBC 4.09 4.00 - 5.20 x10*6/uL    Hemoglobin 11.0 (L) 12.0 - 16.0 g/dL    Hematocrit 36.0 36.0 - 46.0 %    MCV 88 80 - 100 fL    MCH 26.9 26.0 - 34.0 pg    MCHC 30.6 (L) 32.0 - 36.0 g/dL    RDW 15.0 (H) 11.5 - 14.5 %    Platelets 216 150 - 450 x10*3/uL    MPV 10.0 7.5 - 11.5 fL    Neutrophils % 61.4 40.0 - 80.0 %    Immature Granulocytes %, Automated 0.2 0.0 - 0.9 %    Lymphocytes % 29.8 13.0 - 44.0 %    Monocytes % 5.9 2.0 - 10.0 %    Eosinophils % 2.4 0.0 - 6.0 %    Basophils % 0.3 0.0 - 2.0 %    Neutrophils Absolute 4.08 1.20 - 7.70 x10*3/uL    Immature Granulocytes Absolute, Automated 0.01 0.00 - 0.70 x10*3/uL    Lymphocytes Absolute 1.98 1.20 - 4.80 x10*3/uL    Monocytes Absolute 0.39 0.10 - 1.00 x10*3/uL    Eosinophils Absolute 0.16 0.00 - 0.70 x10*3/uL    Basophils Absolute 0.02 0.00 - 0.10 x10*3/uL   Comprehensive metabolic panel   Result Value Ref Range    Glucose 141 (H) 74 - 99 mg/dL    Sodium 138 136 - 145 mmol/L    Potassium 3.7 3.5 - 5.3 mmol/L    Chloride 105 98 - 107 mmol/L    Bicarbonate 25 21 - 32 mmol/L    Anion Gap 12 10 - 20 mmol/L    Urea Nitrogen 20 6 - 23 mg/dL    Creatinine 0.88 0.50 - 1.05 mg/dL    eGFR 77 >60 mL/min/1.73m*2    Calcium 9.2 8.6 - 10.3 mg/dL    Albumin 4.2 3.4 - 5.0 g/dL    Alkaline Phosphatase 93 33 - 110 U/L    Total Protein 6.9 6.4 - 8.2 g/dL    AST 15 9 - 39 U/L    Bilirubin, Total 0.5 0.0 - 1.2 mg/dL    ALT 15 7 - 45 U/L   Lactate   Result Value Ref Range    Lactate 0.7 0.4 - 2.0 mmol/L   POCT GLUCOSE   Result Value Ref Range    POCT Glucose 138 (H) 74 - 99 mg/dL   Protime-INR   Result Value Ref Range    Protime 16.1 (H) 9.8 - 12.8 seconds    INR 1.4  (H) 0.9 - 1.1             Assessment/Plan   Rut is a 57-year-old female patient is alert and oriented x3 presenting to emergency department for evaluation of a cat bite to the left lower extremity that occurred on 10/2/2023.  Patient states that it has become more red and swollen and she has a history of MRSA which prompted her to come to emergency department for evaluation.  Blood cultures pending, IV antibiotics started and admission for further medical management.    Cat bite/cellulitis left lower extremity  Admit to Dr. Nicolas Quintero  See imaging results above  Blood cultures pending  Continue IV vancomycin/cefepime/Flagyl  Tylenol as needed  Sed rate/CRP pending    2.  Diabetes  Diabetic diet  ISS mild corrective  Hypoglycemia protocol  Hold oral antidiabetic medications when med rec is complete    3.  HTN/COPD/osteoarthritis/fibromyalgia/B12 deficiency/TIA/PEs/DVT/depression/POTS  Diabetic diet  Continue home medications when med rec is complete    4.  DVT Ppx  SCDs  Lovenox subcutaneous  Activity as tolerated         I spent 20 minutes in the professional and overall care of this patient.      Krissy Peralta, APRN-CNP

## 2023-10-08 NOTE — PROGRESS NOTES
"Vancomycin Dosing by Pharmacy- INITIAL    Rut Tapia is a 57 y.o. year old female who Pharmacy has been consulted for vancomycin dosing for other sepsis . Based on the patient's indication and renal status this patient will be dosed based on a goal AUC of 400-600.     Renal function is currently stable.                              No results found for: \"PATIENTTEMP\"       Assessment/Plan     Patient will be given a loading dose of 1750 mg  Will initiate vancomycin maintenance, a one time dose of 1750 mg.    This dosing regimen is predicted by [employer] to result in the following pharmacokinetic parameters:  Pharmacy scheduled a trough for 10/8 at 20:00.  We are awaiting a room assignment and more labs, such as SrCr, before establishing a maintenance dose and schedule.     Follow-up level will be ordered on 10/8 at 2000 unless clinically indicated sooner.  Will continue to monitor renal function daily while on vancomycin and order serum creatinine at least every 48 hours if not already ordered.  Follow for continued vancomycin needs, clinical response, and signs/symptoms of toxicity.       Randall Ennis, PharmD       "

## 2023-10-08 NOTE — PROGRESS NOTES
"Vancomycin Dosing by Pharmacy- FOLLOW UP    Rut Tapia is a 57 y.o. year old female who Pharmacy has been consulted for vancomycin dosing for cellulitis, skin and soft tissue. Based on the patient's indication and renal status this patient is being dosed based on a goal AUC of 400-600.     Renal function is currently stable.    Current vancomycin dose: 750 mg given every 12 hours    Estimated vancomycin AUC on current dose: not available mg/L.hr     Visit Vitals  /81   Pulse 71   Temp 36.1 °C (97 °F)   Resp 17        Lab Results   Component Value Date    CREATININE 0.88 10/07/2023    CREATININE 0.69 08/19/2023    CREATININE 0.76 08/18/2023    CREATININE 0.75 08/17/2023    CREATININE 1.49 (H) 08/16/2023        Patient weight is No results found for: \"PTWEIGHT\"    No results found for: \"CULTURE\"     No intake/output data recorded.  [unfilled]    No results found for: \"PATIENTTEMP\"     Assessment/Plan    Within goal random/trough level Trough not yet drawn.      This dosing regimen is predicted by [employer] to result in the following pharmacokinetic parameters:  Regimen: 750 mg IV every 12 hours.  Start time: 10:43 on 10/08/2023  Exposure target: AUC24 (range)400-600 mg/L.hr   AUC24,ss: 450 mg/L.hr  Probability of AUC24 > 400: 59 %  Ctrough,ss: 13.7 mg/L  Probability of Ctrough,ss > 20: 29 %  Probability of nephrotoxicity (Lodise TRISHA 2009): 9 %    The next level will be obtained on 10/8 at 0600. May be obtained sooner if clinically indicated.   Will continue to monitor renal function daily while on vancomycin and order serum creatinine at least every 48 hours if not already ordered.  Follow for continued vancomycin needs, clinical response, and signs/symptoms of toxicity.       Tiffany Stout, [facility]           "

## 2023-10-08 NOTE — NURSING NOTE
Dr. Quintero called made aware that pt inform this nurse and a copy was given for dnr status. Dnrcc . Form placed on chart

## 2023-10-08 NOTE — PROGRESS NOTES
"Vancomycin Dosing by Pharmacy- INITIAL    Rut Tapia is a 57 y.o. year old female who Pharmacy has been consulted for vancomycin dosing for other sepsis . Based on the patient's indication and renal status this patient will be dosed based on a goal AUC of 400-600.     Renal function is currently stable.    Visit Vitals  /61 (BP Location: Right arm, Patient Position: Sitting)   Pulse 73   Temp 36.5 °C (97.7 °F) (Temporal)   Resp 18        Lab Results   Component Value Date    CREATININE 0.69 08/19/2023    CREATININE 0.76 08/18/2023    CREATININE 0.75 08/17/2023    CREATININE 1.49 (H) 08/16/2023        Patient weight is No results found for: \"PTWEIGHT\"    No results found for: \"CULTURE\"     No intake/output data recorded.  [unfilled]    No results found for: \"PATIENTTEMP\"       Assessment/Plan     Patient will be given a loading dose of 1750 mg.  Will initiate vancomycin maintenance, a one time dose of 1750 mg.    This dosing regimen is predicted by [employer] to result in the following pharmacokinetic parameters:      Follow-up level will be ordered on after admitting to a floor  at a later date unless clinically indicated sooner.  Will continue to monitor renal function daily while on vancomycin and order serum creatinine at least every 48 hours if not already ordered.  Follow for continued vancomycin needs, clinical response, and signs/symptoms of toxicity.       Randall Ennis, PharmD       " D/C instructions given and pt verbalizes understanding  OOB to ambulate  Gait steady denies dizziness

## 2023-10-08 NOTE — ED PROVIDER NOTES
EMERGENCY DEPARTMENT ENCOUNTER      Pt Name: Rut Tapia  MRN: [id number]  Birthdate 1965  Date of evaluation: 10/7/2023  Provider: Krishna Shahid DO    CHIEF COMPLAINT       Chief Complaint   Patient presents with    Animal Bite     Cat bite on the left leg, happened on 10/2, pt states it's a family pet. Pt states the cat has all the vaccinations.        HISTORY OF PRESENT ILLNESS    Rut Tapia is a 57 y.o. female who presents to the emergency department with Her self for left lower extremity swelling and redness as well as fevers as high as 100 Fahrenheit at home.  Patient reports that her cat bit her twice on the left shin October 2 and she has noticed the symptoms worsening over the past few days.  She does note she does have a history of MRSA as well.  She was concerned as the swelling was worsening today this prompted her to the emergency department.  She denies any further associated symptoms at this time.        Nursing Notes were reviewed.    REVIEW OF SYSTEMS     CONSTITUTIONAL: Endorses fever.  Denies, sweats, chills.   NEURO: Denies difficulty walking, numbness, weakness, tingling, headache.   HEENT: Denies sore throat, rhinorrhea, changes in vision.   CARDIO: Denies chest pain, palpitations.  PULM: Denies shortness of breath, cough.   GI: Denies abdominal pain, nausea, vomiting, diarrhea, constipation, melena, hematochezia.  : Denies painful urination, frequency, hematuria.   MSK: Endorses cat bite.  SKIN: Endorses leg swelling.  ENDOCRINE: Denies unexpected weight-loss.   HEME: Denies bleeding disorder.     PAST MEDICAL HISTORY     Past Medical History:   Diagnosis Date    Arthritis     Chronic vascular disorders of intestine (CMS/HCC)     SMAS (superior mesenteric artery syndrome)    Narcolepsy without cataplexy     Narcolepsy    Personal history of other diseases of the nervous system and sense organs     History of cataract    Personal history of other infectious and parasitic  diseases     History of staph infection    Personal history of other mental and behavioral disorders     History of dementia    Personal history of other venous thrombosis and embolism     History of deep venous thrombosis    Personal history of urinary calculi     History of kidney stones    Type 2 diabetes mellitus with diabetic neuropathy, unspecified (CMS/HCC)     Diabetes mellitus with diabetic neuropathy         SURGICAL HISTORY       Past Surgical History:   Procedure Laterality Date    APPENDECTOMY  2022    Appendectomy     SECTION, CLASSIC  2013     Section    CHOLECYSTECTOMY  2022    Cholecystectomy    GASTRIC BYPASS  2013    Gastric Surgery For Morbid Obesity Gastric Bypass    HYSTERECTOMY  2022    Hysterectomy    OOPHORECTOMY  2013    Oophorectomy    OTHER SURGICAL HISTORY  2022    Hernia repair    OTHER SURGICAL HISTORY  10/14/2022    Dilation and curettage    OTHER SURGICAL HISTORY  10/14/2022    Laminectomy    OTHER SURGICAL HISTORY  10/14/2022    Spinal cord stimulation    OTHER SURGICAL HISTORY  10/14/2022    Liver biopsy    OTHER SURGICAL HISTORY  10/14/2022    Cardiac catheterization    OTHER SURGICAL HISTORY  2022    Mastectomy bilateral    OTHER SURGICAL HISTORY  2022     section    OTHER SURGICAL HISTORY  2022    Gastric bypass surgery    OTHER SURGICAL HISTORY  2021    Breast biopsy    OTHER SURGICAL HISTORY  2021    Lymphatic Surgery    OTHER SURGICAL HISTORY  2019    Hysterectomy    OTHER SURGICAL HISTORY  2019    Cholecystectomy    OTHER SURGICAL HISTORY  2019    Knee surgery       ALLERGIES     Aspirin; Buprenorphine; Citalopram; Meperidine; Nsaids (non-steroidal anti-inflammatory drug); Penicillins; Prochlorperazine; Abilify [aripiprazole]; Amitriptyline; Azithromycin; Methylprednisolone; Petrolatum,white; and Tobramycin-dexamethasone    FAMILY HISTORY       Family History    Problem Relation Name Age of Onset    Other (blood pressure) Mother          isolated elevated    Other (blood pressure) Father          isolated elevated    Diabetes Father      Other (cardiac problems) Father          reported previous    Diabetes Sister      Ovarian cancer Sister      Other (malignant carcinoma) Sister          of the breast    Diabetes Brother      Sleep apnea Brother          nonorganic    Bipolar disorder Son      Schizophrenia Son      Breast cancer Mother's Sister      Breast cancer Maternal Grandmother      Colon cancer Maternal Grandfather      Breast cancer Maternal Great-Grandmother            SOCIAL HISTORY       Social History     Socioeconomic History    Marital status:      Spouse name: Not on file    Number of children: Not on file    Years of education: Not on file    Highest education level: Not on file   Occupational History    Not on file   Tobacco Use    Smoking status: Never     Passive exposure: Never    Smokeless tobacco: Never   Vaping Use    Vaping Use: Never used   Substance and Sexual Activity    Alcohol use: Never    Drug use: Never    Sexual activity: Not on file   Other Topics Concern    Not on file   Social History Narrative    Not on file     Social Determinants of Health     Financial Resource Strain: Unknown (10/8/2023)    Overall Financial Resource Strain (CARDIA)     Difficulty of Paying Living Expenses: Patient refused   Food Insecurity: No Food Insecurity (10/8/2023)    Hunger Vital Sign     Worried About Running Out of Food in the Last Year: Never true     Ran Out of Food in the Last Year: Never true   Transportation Needs: Unknown (10/8/2023)    PRAPARE - Transportation     Lack of Transportation (Medical): Patient refused     Lack of Transportation (Non-Medical): Patient refused   Physical Activity: Sufficiently Active (10/8/2023)    Exercise Vital Sign     Days of Exercise per Week: 7 days     Minutes of Exercise per Session: 30 min   Stress:  Stress Concern Present (10/8/2023)    [employer] - Occupational Stress Questionnaire     Feeling of Stress : To some extent   Social Connections: Not on file   Intimate Partner Violence: Not on file   Housing Stability: Unknown (10/8/2023)    Housing Stability Vital Sign     Unable to Pay for Housing in the Last Year: No     Number of Places Lived in the Last Year: Not on file     Unstable Housing in the Last Year: No       PHYSICAL EXAM   VS: As documented in the triage note from today's date and EMR flowsheet were reviewed.  Gen: Well developed. No acute distress. Seated in bed. Appears nontoxic.   Skin: Warm. Dry. Intact.  There are bite marks to on the anterior shin no purulence although surrounding cellulitis as well as warm to touch.  No evidence of necrotizing infection no bulla not rapidly expanding.  Eyes: Pupils equally round and reactive to light. Clear sclera.   HENT: Atraumatic appearance. Mucosal membranes moist. No oral lesions, uvula midline, airway patent.   CV: Regular rate and regular rhythm.  +1 pitting pedal edema right-sided +2 pitting pedal edema left-sided. Warm extremities.  Resp: Nonlabored breathing Clear to auscultation bilaterally. No increased work of breathing.   GI: Soft and nontender. No rebound or guarding.   MSK: Symmetric muscle bulk. No joint swelling in the extremities. Compartments are soft. Neurovascularly intact x4 extremities. Radial pulses +2 equal bilaterally.  Pedal pulses +2 intact bilaterally.  Neuro: Alert. Speech fluent. Moving all extremities. No focal deficits. Gait normal.  Psych: Appropriate. K[place].    DIAGNOSTIC RESULTS   RADIOLOGY:   Non-plain film images such as CT, Ultrasound and MRI are read by the radiologist. Plain radiographic images are visualized and preliminarily interpreted by the emergency physician with the below findings: X-ray imaging shows no foreign body or gas.      Interpretation per the Radiologist below, if  available at the time of this note:    XR tibia fibula left 2 views   Final Result   Moderate osteoarthritis knee.   Signed by Andrez Patrick DO            ED BEDSIDE ULTRASOUND:   Performed by ED Physician - none    LABS:  Labs Reviewed   CBC WITH AUTO DIFFERENTIAL - Abnormal       Result Value    WBC 6.6      nRBC 0.0      RBC 4.09      Hemoglobin 11.0 (*)     Hematocrit 36.0      MCV 88      MCH 26.9      MCHC 30.6 (*)     RDW 15.0 (*)     Platelets 216      MPV 10.0      Neutrophils % 61.4      Immature Granulocytes %, Automated 0.2      Lymphocytes % 29.8      Monocytes % 5.9      Eosinophils % 2.4      Basophils % 0.3      Neutrophils Absolute 4.08      Immature Granulocytes Absolute, Automated 0.01      Lymphocytes Absolute 1.98      Monocytes Absolute 0.39      Eosinophils Absolute 0.16      Basophils Absolute 0.02     COMPREHENSIVE METABOLIC PANEL - Abnormal    Glucose 141 (*)     Sodium 138      Potassium 3.7      Chloride 105      Bicarbonate 25      Anion Gap 12      Urea Nitrogen 20      Creatinine 0.88      eGFR 77      Calcium 9.2      Albumin 4.2      Alkaline Phosphatase 93      Total Protein 6.9      AST 15      Bilirubin, Total 0.5      ALT 15     PROTIME-INR - Abnormal    Protime 16.1 (*)     INR 1.4 (*)    C-REACTIVE PROTEIN - Abnormal    C-Reactive Protein 3.39 (*)    SEDIMENTATION RATE, AUTOMATED - Abnormal    Sedimentation Rate 34 (*)    COMPREHENSIVE METABOLIC PANEL - Abnormal    Glucose 119 (*)     Sodium 140      Potassium 4.2      Chloride 108 (*)     Bicarbonate 25      Anion Gap 11      Urea Nitrogen 16      Creatinine 0.89      eGFR 76      Calcium 8.8      Albumin 4.1      Alkaline Phosphatase 88      Total Protein 6.6      AST 15      Bilirubin, Total 0.3      ALT 14     POCT GLUCOSE - Abnormal    POCT Glucose 138 (*)    POCT GLUCOSE - Abnormal    POCT Glucose 121 (*)    BLOOD CULTURE - Normal    Blood Culture Loaded on Instrument - Culture in progress     BLOOD CULTURE - Normal  "   Blood Culture Loaded on Instrument - Culture in progress     LACTATE - Normal    Lactate 0.7      Narrative:     Venipuncture immediately after or during the administration of Metamizole may lead to falsely low results. Testing should be performed immediately  prior to Metamizole dosing.   VANCOMYCIN, TROUGH - Normal    Vancomycin, Trough 12.6     URINALYSIS WITH REFLEX MICROSCOPIC - Normal    Color, Urine Yellow      Appearance, Urine Clear      Specific Gravity, Urine 1.010      pH, Urine 5.0      Protein, Urine NEGATIVE      Glucose, Urine NEGATIVE      Blood, Urine NEGATIVE      Ketones, Urine NEGATIVE      Bilirubin, Urine NEGATIVE      Urobilinogen, Urine <2.0      Nitrite, Urine NEGATIVE      Leukocyte Esterase, Urine NEGATIVE     BASIC METABOLIC PANEL   CBC   POCT GLUCOSE   POCT GLUCOSE       All other labs were within normal range or not returned as of this dictation.    EMERGENCY DEPARTMENT COURSE/MDM:   Vitals:    Vitals:    10/07/23 2024   BP: 134/61   BP Location: Right arm   Patient Position: Sitting   Pulse: 73   Resp: 18   Temp: 36.5 °C (97.7 °F)   TempSrc: Temporal   SpO2: 97%   Weight: 122 kg (270 lb)   Height: 1.6 m (5' 3\")       I reviewed the patient's triage vitals and they are within normal range.    Due to the above findings the following was ordered basic labs to include blood cultures x-ray imaging to rule out foreign body.  Cefepime Flagyl and vancomycin due to patient's history of MRSA and penicillin allergy.    X-ray imaging shows no retained foreign body or gas-forming organism.  Wound is not rapidly expanding patient does not meet SIRS criteria at this time.  Due to the significant area of cellulitis will need IV antibiotics.  She was started on cefepime as well as metronidazole and vancomycin for history of MRSA.  Lab work is relatively unremarkable mild hyperglycemia although in setting of nonfasting blood glucose.  No significant electrolyte derangements lactate within normal " range blood cultures obtained.  Mild anemia but no active signs of bleeding denies any bloody or tarry stools.  Discussed findings with the admitting hospitalist they agreed except the patient for further treatment and evaluation patient remains hemodynamically stable.    Diagnoses as of 10/09/23 0014   Cat bite, initial encounter   Cellulitis of left lower extremity       Patient was counseled regarding labs, imaging, likely diagnosis, and plan. All questions were answered.     ------------------------------------------------------------------  Information provided by the patient.  Consults hospitalist  Past medical history complicating workup history of MRSA  Previous medical records reviewed hospital admission August 2023 for metabolic encephalopathy  ------------------------------------------------------------------  ED Medications administered this visit:    Medications   cefepime (Maxipime) IV 2 g (has no administration in time range)   metroNIDAZOLE in NaCl (iso-os) (Flagyl)  mg (has no administration in time range)   Vancomycin    Final Impression:   1. Cat bite, initial encounter    2. Cellulitis of left lower extremity          Krishna Shahid DO    (Please note that portions of this note were completed with a voice recognition program.  Efforts were made to edit the dictations but occasionally words are mis-transcribed.)     Krishna Shahid DO  10/09/23 0014

## 2023-10-08 NOTE — PROGRESS NOTES
Vancomycin Dosing by Pharmacy- FOLLOW UP    Rut Tapia is a 57 y.o. year old female who Pharmacy has been consulted for vancomycin dosing for cellulitis, skin and soft tissue. Based on the patient's indication and renal status this patient is being dosed based on a goal AUC of 400-600.     Renal function is currently stable.    Current vancomycin dose: 750 mg given every 12 hours    Estimated vancomycin AUC on current dose: 431 mg/L.hr     Visit Vitals  BP (!) 96/49   Pulse 55   Temp 36.1 °C (97 °F)   Resp 20        Lab Results   Component Value Date    CREATININE 0.88 10/07/2023    CREATININE 0.69 08/19/2023    CREATININE 0.76 08/18/2023    CREATININE 0.75 08/17/2023    CREATININE 1.49 (H) 08/16/2023        Patient weight is 118kg      I/O last 3 completed shifts:  In: 700 (5.7 mL/kg) [IV Piggyback:700]  Out: - (0 mL/kg)   Dosing Weight: 122 kg   [unfilled]        Assessment/Plan    Within goal random/trough level    This dosing regimen is predicted by [employer] to result in the following pharmacokinetic parameters:  Loading dose: N/A  Regimen: 750 mg IV every 12 hours.  Start time: 22:37 on 10/08/2023  Exposure target: AUC24 (range)400-600 mg/L.hr   AUC24,ss: 431 mg/L.hr  Probability of AUC24 > 400: 59 %  Ctrough,ss: 14.4 mg/L  Probability of Ctrough,ss > 20: 22 %  Probability of nephrotoxicity (Lodise TRISHA 2009): 10 %    The next level will be obtained on 10/10 with AM labs. May be obtained sooner if clinically indicated.   Will continue to monitor renal function daily while on vancomycin and order serum creatinine at least every 48 hours if not already ordered.  Follow for continued vancomycin needs, clinical response, and signs/symptoms of toxicity.       Merry Richter, GaneshD

## 2023-10-09 ENCOUNTER — APPOINTMENT (OUTPATIENT)
Dept: RADIOLOGY | Facility: HOSPITAL | Age: 58
DRG: 603 | End: 2023-10-09
Payer: MEDICARE

## 2023-10-09 LAB
ANION GAP SERPL CALC-SCNC: 10 MMOL/L (ref 10–20)
BUN SERPL-MCNC: 15 MG/DL (ref 6–23)
CALCIUM SERPL-MCNC: 8.7 MG/DL (ref 8.6–10.3)
CHLORIDE SERPL-SCNC: 108 MMOL/L (ref 98–107)
CO2 SERPL-SCNC: 26 MMOL/L (ref 21–32)
CREAT SERPL-MCNC: 0.8 MG/DL (ref 0.5–1.05)
ERYTHROCYTE [DISTWIDTH] IN BLOOD BY AUTOMATED COUNT: 14.7 % (ref 11.5–14.5)
GFR SERPL CREATININE-BSD FRML MDRD: 87 ML/MIN/1.73M*2
GLUCOSE BLD MANUAL STRIP-MCNC: 139 MG/DL (ref 74–99)
GLUCOSE BLD MANUAL STRIP-MCNC: 163 MG/DL (ref 74–99)
GLUCOSE BLD MANUAL STRIP-MCNC: 170 MG/DL (ref 74–99)
GLUCOSE BLD MANUAL STRIP-MCNC: 210 MG/DL (ref 74–99)
GLUCOSE SERPL-MCNC: 162 MG/DL (ref 74–99)
HCT VFR BLD AUTO: 35.2 % (ref 36–46)
HGB BLD-MCNC: 10.9 G/DL (ref 12–16)
MCH RBC QN AUTO: 27.3 PG (ref 26–34)
MCHC RBC AUTO-ENTMCNC: 31 G/DL (ref 32–36)
MCV RBC AUTO: 88 FL (ref 80–100)
NRBC BLD-RTO: 0 /100 WBCS (ref 0–0)
PLATELET # BLD AUTO: 208 X10*3/UL (ref 150–450)
PMV BLD AUTO: 10 FL (ref 7.5–11.5)
POTASSIUM SERPL-SCNC: 4 MMOL/L (ref 3.5–5.3)
RBC # BLD AUTO: 3.99 X10*6/UL (ref 4–5.2)
SODIUM SERPL-SCNC: 140 MMOL/L (ref 136–145)
WBC # BLD AUTO: 4.5 X10*3/UL (ref 4.4–11.3)

## 2023-10-09 PROCEDURE — A9575 INJ GADOTERATE MEGLUMI 0.1ML: HCPCS | Performed by: INTERNAL MEDICINE

## 2023-10-09 PROCEDURE — 36415 COLL VENOUS BLD VENIPUNCTURE: CPT | Performed by: NURSE PRACTITIONER

## 2023-10-09 PROCEDURE — 2550000001 HC RX 255 CONTRASTS: Performed by: INTERNAL MEDICINE

## 2023-10-09 PROCEDURE — 99232 SBSQ HOSP IP/OBS MODERATE 35: CPT | Performed by: INTERNAL MEDICINE

## 2023-10-09 PROCEDURE — 2500000002 HC RX 250 W HCPCS SELF ADMINISTERED DRUGS (ALT 637 FOR MEDICARE OP, ALT 636 FOR OP/ED): Performed by: INTERNAL MEDICINE

## 2023-10-09 PROCEDURE — 2500000001 HC RX 250 WO HCPCS SELF ADMINISTERED DRUGS (ALT 637 FOR MEDICARE OP): Performed by: INTERNAL MEDICINE

## 2023-10-09 PROCEDURE — 85027 COMPLETE CBC AUTOMATED: CPT | Performed by: NURSE PRACTITIONER

## 2023-10-09 PROCEDURE — 2500000004 HC RX 250 GENERAL PHARMACY W/ HCPCS (ALT 636 FOR OP/ED): Performed by: INTERNAL MEDICINE

## 2023-10-09 PROCEDURE — 96372 THER/PROPH/DIAG INJ SC/IM: CPT | Performed by: INTERNAL MEDICINE

## 2023-10-09 PROCEDURE — 82947 ASSAY GLUCOSE BLOOD QUANT: CPT

## 2023-10-09 PROCEDURE — 73720 MRI LWR EXTREMITY W/O&W/DYE: CPT | Mod: LEFT SIDE | Performed by: RADIOLOGY

## 2023-10-09 PROCEDURE — 73720 MRI LWR EXTREMITY W/O&W/DYE: CPT | Mod: LT,MG

## 2023-10-09 PROCEDURE — G0378 HOSPITAL OBSERVATION PER HR: HCPCS

## 2023-10-09 PROCEDURE — 2500000004 HC RX 250 GENERAL PHARMACY W/ HCPCS (ALT 636 FOR OP/ED)

## 2023-10-09 PROCEDURE — 80048 BASIC METABOLIC PNL TOTAL CA: CPT | Performed by: NURSE PRACTITIONER

## 2023-10-09 RX ORDER — GADOTERATE MEGLUMINE 376.9 MG/ML
20 INJECTION INTRAVENOUS
Status: COMPLETED | OUTPATIENT
Start: 2023-10-09 | End: 2023-10-09

## 2023-10-09 RX ADMIN — Medication 1 TABLET: at 09:00

## 2023-10-09 RX ADMIN — METRONIDAZOLE 500 MG: 500 INJECTION, SOLUTION INTRAVENOUS at 15:11

## 2023-10-09 RX ADMIN — BACLOFEN 10 MG: 10 TABLET ORAL at 21:26

## 2023-10-09 RX ADMIN — QUETIAPINE FUMARATE 200 MG: 100 TABLET ORAL at 21:25

## 2023-10-09 RX ADMIN — WARFARIN SODIUM 7.5 MG: 5 TABLET ORAL at 17:51

## 2023-10-09 RX ADMIN — METRONIDAZOLE 500 MG: 500 INJECTION, SOLUTION INTRAVENOUS at 21:29

## 2023-10-09 RX ADMIN — VANCOMYCIN HYDROCHLORIDE 750 MG: 750 INJECTION, SOLUTION INTRAVENOUS at 12:11

## 2023-10-09 RX ADMIN — GADOTERATE MEGLUMINE 20 ML: 376.9 INJECTION INTRAVENOUS at 14:47

## 2023-10-09 RX ADMIN — BACLOFEN 10 MG: 10 TABLET ORAL at 09:47

## 2023-10-09 RX ADMIN — CEFEPIME 2 G: 1 INJECTION, SOLUTION INTRAVENOUS at 05:14

## 2023-10-09 RX ADMIN — ARIPIPRAZOLE 2 MG: 2 TABLET ORAL at 09:49

## 2023-10-09 RX ADMIN — FAMOTIDINE 20 MG: 20 TABLET, FILM COATED ORAL at 21:25

## 2023-10-09 RX ADMIN — ENOXAPARIN SODIUM 40 MG: 40 INJECTION SUBCUTANEOUS at 09:47

## 2023-10-09 RX ADMIN — CEFEPIME 2 G: 1 INJECTION, SOLUTION INTRAVENOUS at 16:22

## 2023-10-09 RX ADMIN — GABAPENTIN 750 MG: 250 SOLUTION ORAL at 05:49

## 2023-10-09 RX ADMIN — Medication 500 MG: at 09:53

## 2023-10-09 RX ADMIN — IRON SUCROSE 200 MG: 20 INJECTION, SOLUTION INTRAVENOUS at 16:10

## 2023-10-09 RX ADMIN — METRONIDAZOLE 500 MG: 500 INJECTION, SOLUTION INTRAVENOUS at 05:49

## 2023-10-09 RX ADMIN — INSULIN LISPRO 1 UNITS: 100 INJECTION, SOLUTION INTRAVENOUS; SUBCUTANEOUS at 09:53

## 2023-10-09 RX ADMIN — VANCOMYCIN HYDROCHLORIDE 750 MG: 750 INJECTION, SOLUTION INTRAVENOUS at 23:00

## 2023-10-09 RX ADMIN — BACLOFEN 10 MG: 10 TABLET ORAL at 15:11

## 2023-10-09 RX ADMIN — MORPHINE SULFATE 2 MG: 2 INJECTION, SOLUTION INTRAMUSCULAR; INTRAVENOUS at 09:53

## 2023-10-09 RX ADMIN — GABAPENTIN 750 MG: 250 SOLUTION ORAL at 17:58

## 2023-10-09 RX ADMIN — PROMETHAZINE HYDROCHLORIDE 25 MG: 25 TABLET ORAL at 16:16

## 2023-10-09 RX ADMIN — INSULIN LISPRO 1 UNITS: 100 INJECTION, SOLUTION INTRAVENOUS; SUBCUTANEOUS at 15:14

## 2023-10-09 RX ADMIN — CALCIUM CARBONATE (ANTACID) CHEW TAB 500 MG 500 MG: 500 CHEW TAB at 09:48

## 2023-10-09 RX ADMIN — FAMOTIDINE 20 MG: 20 TABLET, FILM COATED ORAL at 09:47

## 2023-10-09 RX ADMIN — MORPHINE SULFATE 2 MG: 2 INJECTION, SOLUTION INTRAMUSCULAR; INTRAVENOUS at 15:13

## 2023-10-09 RX ADMIN — ENOXAPARIN SODIUM 40 MG: 40 INJECTION SUBCUTANEOUS at 21:24

## 2023-10-09 RX ADMIN — THIAMINE HCL TAB 100 MG 500 MG: 100 TAB at 09:48

## 2023-10-09 ASSESSMENT — PAIN SCALES - GENERAL
PAINLEVEL_OUTOF10: 7
PAINLEVEL_OUTOF10: 7

## 2023-10-09 NOTE — H&P
History Of Present Illness  Rut Tapia is a 57 y.o. female presenting to emergency department for evaluation of a cat bite to her left lower extremity that occurred on 10/2/2023.  Patient states that she sustained a cat bite from her own cat.  Patient states that she cleaned it out and it was doing fine until the last several days she noticed an increase in redness and swelling.  Patient denies chest pain or dizziness.  Patient denies shortness of breath, nausea, vomiting, diarrhea.  Patient denies fevers.  Patient states the cat is current on all of its vaccinations.  Patient does admit to a history of MRSA and the swelling prompted her to come to the emergency department for evaluation.    In ED, glucose 141, hemoglobin 11.0.  Blood cultures pending.  Patient started on IV vancomycin, cefepime, Flagyl IV due to antibiotic allergies.  PT/INR ordered.  Sed rate and CRP ordered and pending.  Blood pressure 134/61, heart rate 73, respirations 18, temperature 36.5 °C, SPO2 97% on room air.  Patient admitted to observation under the care of Dr. Nicolas Quintero will continue to follow.  I was asked to do H&P and place initial admission orders.     Past Medical History  Past Medical History:   Diagnosis Date    Arthritis     Chronic vascular disorders of intestine (CMS/[facility])     SMAS (superior mesenteric artery syndrome)    Narcolepsy without cataplexy     Narcolepsy    Personal history of other diseases of the nervous system and sense organs     History of cataract    Personal history of other infectious and parasitic diseases     History of staph infection    Personal history of other mental and behavioral disorders     History of dementia    Personal history of other venous thrombosis and embolism     History of deep venous thrombosis    Personal history of urinary calculi     History of kidney stones    Type 2 diabetes mellitus with diabetic neuropathy, unspecified (CMS/HCC)     Diabetes mellitus with diabetic  neuropathy       Surgical History  Past Surgical History:   Procedure Laterality Date    APPENDECTOMY  2022    Appendectomy     SECTION, CLASSIC  2013     Section    CHOLECYSTECTOMY  2022    Cholecystectomy    GASTRIC BYPASS  2013    Gastric Surgery For Morbid Obesity Gastric Bypass    HYSTERECTOMY  2022    Hysterectomy    OOPHORECTOMY  2013    Oophorectomy    OTHER SURGICAL HISTORY  2022    Hernia repair    OTHER SURGICAL HISTORY  10/14/2022    Dilation and curettage    OTHER SURGICAL HISTORY  10/14/2022    Laminectomy    OTHER SURGICAL HISTORY  10/14/2022    Spinal cord stimulation    OTHER SURGICAL HISTORY  10/14/2022    Liver biopsy    OTHER SURGICAL HISTORY  10/14/2022    Cardiac catheterization    OTHER SURGICAL HISTORY  2022    Mastectomy bilateral    OTHER SURGICAL HISTORY  2022     section    OTHER SURGICAL HISTORY  2022    Gastric bypass surgery    OTHER SURGICAL HISTORY  2021    Breast biopsy    OTHER SURGICAL HISTORY  2021    Lymphatic Surgery    OTHER SURGICAL HISTORY  2019    Hysterectomy    OTHER SURGICAL HISTORY  2019    Cholecystectomy    OTHER SURGICAL HISTORY  2019    Knee surgery        Social History  She reports that she has never smoked. She has never been exposed to tobacco smoke. She has never used smokeless tobacco. She reports that she does not drink alcohol and does not use drugs.    Family History  Family History   Problem Relation Name Age of Onset    Other (blood pressure) Mother          isolated elevated    Other (blood pressure) Father          isolated elevated    Diabetes Father      Other (cardiac problems) Father          reported previous    Diabetes Sister      Ovarian cancer Sister      Other (malignant carcinoma) Sister          of the breast    Diabetes Brother      Sleep apnea Brother          nonorganic    Bipolar disorder Son      Schizophrenia Son       "Breast cancer Mother's Sister      Breast cancer Maternal Grandmother      Colon cancer Maternal Grandfather      Breast cancer Maternal Great-Grandmother          Allergies  Aspirin; Buprenorphine; Citalopram; Meperidine; Nsaids (non-steroidal anti-inflammatory drug); Penicillins; Prochlorperazine; Abilify [aripiprazole]; Amitriptyline; Azithromycin; Methylprednisolone; Petrolatum,white; and Tobramycin-dexamethasone    Review of Systems  A 10 point review of systems was completed and negative except what is listed in HPI  Physical Exam  Constitutional:       Appearance: She is obese.   HENT:      Nose: Nose normal.      Mouth/Throat:      Mouth: Mucous membranes are moist.      Pharynx: Oropharynx is clear.   Eyes:      Pupils: Pupils are equal, round, and reactive to light.   Cardiovascular:      Rate and Rhythm: Normal rate and regular rhythm.   Pulmonary:      Effort: Pulmonary effort is normal.   Abdominal:      General: Bowel sounds are normal.   Musculoskeletal:      Cervical back: Normal range of motion.      Right lower leg: Edema present.      Comments: Erythema to anterior left shin   Skin:     General: Skin is warm.      Capillary Refill: Capillary refill takes less than 2 seconds.   Neurological:      Mental Status: She is alert.   Psychiatric:         Mood and Affect: Mood normal.          Last Recorded Vitals  Blood pressure 117/56, pulse 63, temperature 36.5 °C (97.7 °F), temperature source Temporal, resp. rate 20, height 1.6 m (5' 3\"), weight 118 kg (259 lb 14.8 oz), SpO2 96 %.    Relevant Results  XR tibia fibula left 2 views    Result Date: 10/7/2023  STUDY: Tibia and Fibula Radiographs; 10/07/2023 9:14 pm INDICATION: Cat bite. COMPARISON: None Available. ACCESSION NUMBER(S): [id number] ORDERING CLINICIAN: JERRY SMITH TECHNIQUE:  Two view(s) of the left tibia and fibula. FINDINGS:  There is no displaced fracture.  Moderate osteoarthritis is demonstrated the knee joint.  There are no " erosive changes.  No soft tissue mass or any soft tissue gas.    Moderate osteoarthritis knee. Signed by Andrez Patrick, DO      Results for orders placed or performed during the hospital encounter of 10/07/23 (from the past 24 hour(s))   CBC and Auto Differential   Result Value Ref Range    WBC 6.6 4.4 - 11.3 x10*3/uL    nRBC 0.0 0.0 - 0.0 /100 WBCs    RBC 4.09 4.00 - 5.20 x10*6/uL    Hemoglobin 11.0 (L) 12.0 - 16.0 g/dL    Hematocrit 36.0 36.0 - 46.0 %    MCV 88 80 - 100 fL    MCH 26.9 26.0 - 34.0 pg    MCHC 30.6 (L) 32.0 - 36.0 g/dL    RDW 15.0 (H) 11.5 - 14.5 %    Platelets 216 150 - 450 x10*3/uL    MPV 10.0 7.5 - 11.5 fL    Neutrophils % 61.4 40.0 - 80.0 %    Immature Granulocytes %, Automated 0.2 0.0 - 0.9 %    Lymphocytes % 29.8 13.0 - 44.0 %    Monocytes % 5.9 2.0 - 10.0 %    Eosinophils % 2.4 0.0 - 6.0 %    Basophils % 0.3 0.0 - 2.0 %    Neutrophils Absolute 4.08 1.20 - 7.70 x10*3/uL    Immature Granulocytes Absolute, Automated 0.01 0.00 - 0.70 x10*3/uL    Lymphocytes Absolute 1.98 1.20 - 4.80 x10*3/uL    Monocytes Absolute 0.39 0.10 - 1.00 x10*3/uL    Eosinophils Absolute 0.16 0.00 - 0.70 x10*3/uL    Basophils Absolute 0.02 0.00 - 0.10 x10*3/uL   Comprehensive metabolic panel   Result Value Ref Range    Glucose 141 (H) 74 - 99 mg/dL    Sodium 138 136 - 145 mmol/L    Potassium 3.7 3.5 - 5.3 mmol/L    Chloride 105 98 - 107 mmol/L    Bicarbonate 25 21 - 32 mmol/L    Anion Gap 12 10 - 20 mmol/L    Urea Nitrogen 20 6 - 23 mg/dL    Creatinine 0.88 0.50 - 1.05 mg/dL    eGFR 77 >60 mL/min/1.73m*2    Calcium 9.2 8.6 - 10.3 mg/dL    Albumin 4.2 3.4 - 5.0 g/dL    Alkaline Phosphatase 93 33 - 110 U/L    Total Protein 6.9 6.4 - 8.2 g/dL    AST 15 9 - 39 U/L    Bilirubin, Total 0.5 0.0 - 1.2 mg/dL    ALT 15 7 - 45 U/L   Blood Culture    Specimen: Arterial Line; Blood culture   Result Value Ref Range    Blood Culture Loaded on Instrument - Culture in progress    Blood Culture    Specimen: Arterial Line; Blood culture    Result Value Ref Range    Blood Culture Loaded on Instrument - Culture in progress    Lactate   Result Value Ref Range    Lactate 0.7 0.4 - 2.0 mmol/L   POCT GLUCOSE   Result Value Ref Range    POCT Glucose 138 (H) 74 - 99 mg/dL   Protime-INR   Result Value Ref Range    Protime 16.1 (H) 9.8 - 12.8 seconds    INR 1.4 (H) 0.9 - 1.1   Vancomycin, Trough   Result Value Ref Range    Vancomycin, Trough 12.6 5.0 - 20.0 ug/mL   C-reactive protein   Result Value Ref Range    C-Reactive Protein 3.39 (H) <1.00 mg/dL   Sedimentation rate, automated   Result Value Ref Range    Sedimentation Rate 34 (H) 0 - 30 mm/h             Assessment/Plan   Rut is a 57-year-old female patient is alert and oriented x3 presenting to emergency department for evaluation of a cat bite to the left lower extremity that occurred on 10/2/2023.  Patient states that it has become more red and swollen and she has a history of MRSA which prompted her to come to emergency department for evaluation.  Blood cultures pending, IV antibiotics started and admission for further medical management.    Cat bite/cellulitis left lower extremity  Admit to Dr. Nicolas Quintero  See imaging results above  Blood cultures pending  Continue IV vancomycin/cefepime/Flagyl  Tylenol as needed  Sed rate/CRP pending  Ortho consulted given tenderness    2.  Diabetes  Diabetic diet  ISS mild corrective  Hypoglycemia protocol  Hold oral antidiabetic medications when med rec is complete    3.  HTN/COPD/osteoarthritis/fibromyalgia/B12 deficiency/TIA/PEs/DVT/depression/POTS  Diabetic diet  Continue home medications when med rec is complete    4.  DVT Ppx  SCDs  Lovenox subcutaneous  Activity as tolerated         I spent 40 minutes in the professional and overall care of this patient.      Nicolas Quintero, DO

## 2023-10-09 NOTE — CARE PLAN
The patient's goals for the shift include pt will improve pain and be injury from fall by end of shift.     The clinical goals for the shift include pt will improve lle pain and improve mobility     Over the shift, the patient is making progress. Barriers to progression include LLE mobility and pain. Recommendations to address these barriers include pain management.

## 2023-10-09 NOTE — CONSULTS
Rut Tapia is a 57 y.o. year old female patient with   referred for a cat bite of the left lower leg .      History of Present Ilness  The patient is a 57-year-old female with history of diabetes who is bit by her cat on 2023.  She noted progressive pain, redness and swelling and presented to the emergency department on .  She has been receiving IV antibiotics ever since and believes that the symptoms have improved, but not completely resolved.  She complains of significant pain in the left pretibial area.  Allergies:   Allergies   Allergen Reactions    Aspirin Anaphylaxis    Buprenorphine Anaphylaxis    Citalopram Anaphylaxis    Meperidine Anaphylaxis    Nsaids (Non-Steroidal Anti-Inflammatory Drug) Anaphylaxis    Penicillins Anaphylaxis    Prochlorperazine Anaphylaxis    Abilify [Aripiprazole] Unknown    Amitriptyline Unknown    Azithromycin Unknown    Methylprednisolone Unknown    Petrolatum,White Unknown    Tobramycin-Dexamethasone Unknown        PMHx:   Past Medical History:   Diagnosis Date    Arthritis     Chronic vascular disorders of intestine (CMS/HCC)     SMAS (superior mesenteric artery syndrome)    Narcolepsy without cataplexy     Narcolepsy    Personal history of other diseases of the nervous system and sense organs     History of cataract    Personal history of other infectious and parasitic diseases     History of staph infection    Personal history of other mental and behavioral disorders     History of dementia    Personal history of other venous thrombosis and embolism     History of deep venous thrombosis    Personal history of urinary calculi     History of kidney stones    Type 2 diabetes mellitus with diabetic neuropathy, unspecified (CMS/HCC)     Diabetes mellitus with diabetic neuropathy        PSHx:   Past Surgical History:   Procedure Laterality Date    APPENDECTOMY  2022    Appendectomy     SECTION, CLASSIC  2013     Section     CHOLECYSTECTOMY  2022    Cholecystectomy    GASTRIC BYPASS  2013    Gastric Surgery For Morbid Obesity Gastric Bypass    HYSTERECTOMY  2022    Hysterectomy    OOPHORECTOMY  2013    Oophorectomy    OTHER SURGICAL HISTORY  2022    Hernia repair    OTHER SURGICAL HISTORY  10/14/2022    Dilation and curettage    OTHER SURGICAL HISTORY  10/14/2022    Laminectomy    OTHER SURGICAL HISTORY  10/14/2022    Spinal cord stimulation    OTHER SURGICAL HISTORY  10/14/2022    Liver biopsy    OTHER SURGICAL HISTORY  10/14/2022    Cardiac catheterization    OTHER SURGICAL HISTORY  2022    Mastectomy bilateral    OTHER SURGICAL HISTORY  2022     section    OTHER SURGICAL HISTORY  2022    Gastric bypass surgery    OTHER SURGICAL HISTORY  2021    Breast biopsy    OTHER SURGICAL HISTORY  2021    Lymphatic Surgery    OTHER SURGICAL HISTORY  2019    Hysterectomy    OTHER SURGICAL HISTORY  2019    Cholecystectomy    OTHER SURGICAL HISTORY  2019    Knee surgery          Current medications:  No current facility-administered medications on file prior to encounter.     Current Outpatient Medications on File Prior to Encounter   Medication Sig Dispense Refill    albuterol 90 mcg/actuation inhaler Inhale 2 puffs every 4 hours if needed.      ascorbic acid (Vitamin C) 500 mg tablet Take 1 tablet (500 mg) by mouth once daily.      baclofen (Lioresal) 10 mg tablet Take 1 tablet (10 mg) by mouth 3 times a day.      baclofen (Lioresal) 5 mg tablet Take by mouth.      blood sugar diagnostic (Accu-Chek Martha Plus test strp) strip 4 times a day.      calcium carbonate (TUMS ORAL) Take by mouth. 500 MG Oral Tablet chewable      cranberry/B.coagulan/C/calcium (CRANBERRY-PROBIOTIC ORAL) Take by mouth. Tablets      cyanocobalamin, vitamin B-12, (VITAMIN B-12 ORAL) Take 1 tablet by mouth once daily. TABS      cyanocobalamin, vitamin B-12, 1,000 mcg/mL kit Inject 1 mL into  "the shoulder, thigh, or buttocks every 14 (fourteen) days.      docusate sodium (Colace) 100 mg capsule Take 1 capsule (100 mg) by mouth 3 times a day.      docusate sodium (DOK) 100 mg capsule Take 1 capsule (100 mg) by mouth 2 times a day.      ergocalciferol (Vitamin D2) 1.25 MG (88938 UT) capsule Take 1 capsule (1,250 mcg) by mouth 1 (one) time per week.      famotidine (Pepcid) 10 mg tablet Take by mouth.      gabapentin 250 mg/5 mL (5 mL) solution Take 15 mL by mouth 3 times a day.      insulin glargine (Lantus U-100 Insulin) 100 unit/mL injection Inject under the skin.      insulin syringe-needle U-100 (BD Insulin Syringe Ultra-Fine) 30G X 1/2\" 0.3 mL syringe Inject under the skin. Use as directed      lansoprazole (Prevacid) 30 mg DR capsule Take by mouth. Do not crush or chew.      levothyroxine (Synthroid, Levoxyl) 25 mcg tablet Take by mouth.      methadone (Dolophine) 5 mg/5 mL solution Take 5 mL (5 mg) by mouth 3 times a day.      multivitamin tablet Take by mouth.      naloxone (Narcan) 4 mg/0.1 mL nasal spray Administer into affected nostril(s) if needed (for overdose).      nitroglycerin (Nitrostat) 0.4 mg SL tablet Place under the tongue.      NON FORMULARY RA Pain Reliever Ex St 500 MG/15ML Oral Liquid; as directed      olmesartan (BENIcar) 20 mg tablet Take by mouth.      promethazine (Phenergan) 25 mg tablet Take 1 tablet (25 mg) by mouth 2 times a day as needed.      promethazine (Phenergan) 50 mg tablet Take by mouth.      QUEtiapine (SEROquel) 200 mg tablet Take by mouth.      rosuvastatin (Crestor) 5 mg tablet Take 1 tablet (5 mg) by mouth once daily.      SUMAtriptan (Imitrex) 100 mg tablet Take by mouth.      thiamine HCl (VITAMIN B-1 ORAL) Take by mouth. TABS      topiramate (Topamax) 50 mg tablet Take 1 tablet (50 mg) by mouth 2 times a day.      valACYclovir (Valtrex) 500 mg tablet Take by mouth.      warfarin (Coumadin) 7.5 mg tablet Take by mouth. Take as directed per After Visit " "Summary.      ZINC ORAL Take 4 tablets by mouth once daily. 50 mg oral tablet; when having open wounds          Outpatient medications:  Current Discharge Medication List        CONTINUE these medications which have NOT CHANGED    Details   albuterol 90 mcg/actuation inhaler Inhale 2 puffs every 4 hours if needed.      ascorbic acid (Vitamin C) 500 mg tablet Take 1 tablet (500 mg) by mouth once daily.      !! baclofen (Lioresal) 10 mg tablet Take 1 tablet (10 mg) by mouth 3 times a day.      !! baclofen (Lioresal) 5 mg tablet Take by mouth.      blood sugar diagnostic (Accu-Chek Martha Plus test strp) strip 4 times a day.      calcium carbonate (TUMS ORAL) Take by mouth. 500 MG Oral Tablet chewable      cranberry/B.coagulan/C/calcium (CRANBERRY-PROBIOTIC ORAL) Take by mouth. Tablets      cyanocobalamin, vitamin B-12, (VITAMIN B-12 ORAL) Take 1 tablet by mouth once daily. TABS      cyanocobalamin, vitamin B-12, 1,000 mcg/mL kit Inject 1 mL into the shoulder, thigh, or buttocks every 14 (fourteen) days.      !! docusate sodium (Colace) 100 mg capsule Take 1 capsule (100 mg) by mouth 3 times a day.      !! docusate sodium (DOK) 100 mg capsule Take 1 capsule (100 mg) by mouth 2 times a day.      ergocalciferol (Vitamin D2) 1.25 MG (73112 UT) capsule Take 1 capsule (1,250 mcg) by mouth 1 (one) time per week.      famotidine (Pepcid) 10 mg tablet Take by mouth.      gabapentin 250 mg/5 mL (5 mL) solution Take 15 mL by mouth 3 times a day.      insulin glargine (Lantus U-100 Insulin) 100 unit/mL injection Inject under the skin.      insulin syringe-needle U-100 (BD Insulin Syringe Ultra-Fine) 30G X 1/2\" 0.3 mL syringe Inject under the skin. Use as directed      lansoprazole (Prevacid) 30 mg DR capsule Take by mouth. Do not crush or chew.      levothyroxine (Synthroid, Levoxyl) 25 mcg tablet Take by mouth.      methadone (Dolophine) 5 mg/5 mL solution Take 5 mL (5 mg) by mouth 3 times a day.      multivitamin tablet Take by " mouth.      naloxone (Narcan) 4 mg/0.1 mL nasal spray Administer into affected nostril(s) if needed (for overdose).      nitroglycerin (Nitrostat) 0.4 mg SL tablet Place under the tongue.      NON FORMULARY RA Pain Reliever Ex St 500 MG/15ML Oral Liquid; as directed      olmesartan (BENIcar) 20 mg tablet Take by mouth.      !! promethazine (Phenergan) 25 mg tablet Take 1 tablet (25 mg) by mouth 2 times a day as needed.      !! promethazine (Phenergan) 50 mg tablet Take by mouth.      QUEtiapine (SEROquel) 200 mg tablet Take by mouth.      rosuvastatin (Crestor) 5 mg tablet Take 1 tablet (5 mg) by mouth once daily.      SUMAtriptan (Imitrex) 100 mg tablet Take by mouth.      thiamine HCl (VITAMIN B-1 ORAL) Take by mouth. TABS      topiramate (Topamax) 50 mg tablet Take 1 tablet (50 mg) by mouth 2 times a day.      valACYclovir (Valtrex) 500 mg tablet Take by mouth.      warfarin (Coumadin) 7.5 mg tablet Take by mouth. Take as directed per After Visit Summary.      ZINC ORAL Take 4 tablets by mouth once daily. 50 mg oral tablet; when having open wounds       !! - Potential duplicate medications found. Please discuss with provider.          Labs:  Lab Results   Component Value Date    WBC 4.5 10/09/2023    HGB 10.9 (L) 10/09/2023    HCT 35.2 (L) 10/09/2023    MCV 88 10/09/2023     10/09/2023       Results from last 7 days   Lab Units 10/08/23  0004   INR  1.4*       Lab Results   Component Value Date    GLUCOSE 119 (H) 10/08/2023    CALCIUM 8.8 10/08/2023     10/08/2023    K 4.2 10/08/2023    CO2 25 10/08/2023     (H) 10/08/2023    BUN 16 10/08/2023    CREATININE 0.89 10/08/2023           Xrays:  === 10/07/23 ===    XR TIBIA FIBULA 2 VIEWS LEFT    - Impression -  Moderate osteoarthritis knee.  Signed by Andrez Patrick DO    Vitals:  Vitals:    10/09/23 0600   BP: 125/59   Pulse: 61   Resp:    Temp: 36.1 °C (97 °F)   SpO2: 95%       BMI percentile: Facility age limit for growth %campos is 20  years.    Wt Readings from Last 4 Encounters:   10/08/23 118 kg (259 lb 14.8 oz)   04/04/22 114 kg (251 lb 5.2 oz)   10/03/21 105 kg (232 lb)   09/16/21 106 kg (234 lb)       Exam  The patient is alert and oriented x 3.  Inspection of the left lower extremity reveals 2 scabbed over lacerations over the superior pretibial area, with one measuring 1.5 cm and the other 2.5 cm.  There is no active drainage.  There is surrounding erythema, but no evidence of streaking.  Mild swelling is noted,.  There is tenderness to palpation over the pretibial area, but no evidence of fluctuance.  She is able to flex the knee from 0 to 75 degrees with discomfort.    Assessment:  Left lower extremity infection secondary to cat bite.  Clinically there is no evidence of a underlying abscess.  However, an abscess cannot be completely excluded.    Plan:  Plan for MRI of the left lower extremity to rule out deep abscess.  Continue with IV antibiotics.

## 2023-10-09 NOTE — PROGRESS NOTES
"   10/09/23 1522   Discharge Planning   Living Arrangements Spouse/significant other   Support Systems Spouse/significant other;Parent;Family members   Assistance Needed independent   Type of Residence Private residence   Number of Stairs to Enter Residence 0   Number of Stairs Within Residence 0   Do you have animals or pets at home? Yes   Type of Animals or Pets cat   Patient expects to be discharged to: home     I met with patient at her bedside, verified insurance and demographics.  Patient lives with her  and cat.  She ambulates with a standing rollator, drives and is mostly independent with ADLs.  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  [facility] Lennon at [facility] is her PCP.  Patient is agreeable to [facility] but she will be attending a wedding in [place] next weekend and would like to wait until she returns to start working with therapies.  Referral to be sent.  Phillip RN TCC  [id number]:  Patient's brother Homer Fuller ([phone]) is her POA  "

## 2023-10-09 NOTE — CARE PLAN
The patient's goals for the shift the pt will be free from inj    The clinical goals for the shift include progressing    Over the shift, the patient did not make progress toward the following goals. Barriers to progression include ***. Recommendations to address these barriers include ***.

## 2023-10-10 LAB
ANION GAP SERPL CALC-SCNC: 10 MMOL/L (ref 10–20)
BUN SERPL-MCNC: 11 MG/DL (ref 6–23)
CALCIUM SERPL-MCNC: 8.5 MG/DL (ref 8.6–10.3)
CHLORIDE SERPL-SCNC: 108 MMOL/L (ref 98–107)
CO2 SERPL-SCNC: 25 MMOL/L (ref 21–32)
CREAT SERPL-MCNC: 0.72 MG/DL (ref 0.5–1.05)
ERYTHROCYTE [DISTWIDTH] IN BLOOD BY AUTOMATED COUNT: 14.6 % (ref 11.5–14.5)
GFR SERPL CREATININE-BSD FRML MDRD: >90 ML/MIN/1.73M*2
GLUCOSE BLD MANUAL STRIP-MCNC: 110 MG/DL (ref 74–99)
GLUCOSE BLD MANUAL STRIP-MCNC: 123 MG/DL (ref 74–99)
GLUCOSE BLD MANUAL STRIP-MCNC: 130 MG/DL (ref 74–99)
GLUCOSE BLD MANUAL STRIP-MCNC: 178 MG/DL (ref 74–99)
GLUCOSE BLD MANUAL STRIP-MCNC: 191 MG/DL (ref 74–99)
GLUCOSE SERPL-MCNC: 126 MG/DL (ref 74–99)
HCT VFR BLD AUTO: 34.7 % (ref 36–46)
HGB BLD-MCNC: 10.7 G/DL (ref 12–16)
MCH RBC QN AUTO: 27.1 PG (ref 26–34)
MCHC RBC AUTO-ENTMCNC: 30.8 G/DL (ref 32–36)
MCV RBC AUTO: 88 FL (ref 80–100)
NRBC BLD-RTO: 0 /100 WBCS (ref 0–0)
PLATELET # BLD AUTO: 237 X10*3/UL (ref 150–450)
PMV BLD AUTO: 10 FL (ref 7.5–11.5)
POTASSIUM SERPL-SCNC: 4.1 MMOL/L (ref 3.5–5.3)
RBC # BLD AUTO: 3.95 X10*6/UL (ref 4–5.2)
SODIUM SERPL-SCNC: 139 MMOL/L (ref 136–145)
VANCOMYCIN TROUGH SERPL-MCNC: 10.8 UG/ML (ref 5–20)
WBC # BLD AUTO: 5 X10*3/UL (ref 4.4–11.3)

## 2023-10-10 PROCEDURE — 36415 COLL VENOUS BLD VENIPUNCTURE: CPT

## 2023-10-10 PROCEDURE — 2500000004 HC RX 250 GENERAL PHARMACY W/ HCPCS (ALT 636 FOR OP/ED): Performed by: INTERNAL MEDICINE

## 2023-10-10 PROCEDURE — 2500000001 HC RX 250 WO HCPCS SELF ADMINISTERED DRUGS (ALT 637 FOR MEDICARE OP): Performed by: INTERNAL MEDICINE

## 2023-10-10 PROCEDURE — 96372 THER/PROPH/DIAG INJ SC/IM: CPT | Performed by: INTERNAL MEDICINE

## 2023-10-10 PROCEDURE — 82947 ASSAY GLUCOSE BLOOD QUANT: CPT

## 2023-10-10 PROCEDURE — 85027 COMPLETE CBC AUTOMATED: CPT

## 2023-10-10 PROCEDURE — 80048 BASIC METABOLIC PNL TOTAL CA: CPT

## 2023-10-10 PROCEDURE — 2500000004 HC RX 250 GENERAL PHARMACY W/ HCPCS (ALT 636 FOR OP/ED): Performed by: STUDENT IN AN ORGANIZED HEALTH CARE EDUCATION/TRAINING PROGRAM

## 2023-10-10 PROCEDURE — 2500000002 HC RX 250 W HCPCS SELF ADMINISTERED DRUGS (ALT 637 FOR MEDICARE OP, ALT 636 FOR OP/ED): Performed by: INTERNAL MEDICINE

## 2023-10-10 PROCEDURE — 99232 SBSQ HOSP IP/OBS MODERATE 35: CPT | Performed by: INTERNAL MEDICINE

## 2023-10-10 PROCEDURE — 80202 ASSAY OF VANCOMYCIN: CPT | Performed by: INTERNAL MEDICINE

## 2023-10-10 PROCEDURE — G0378 HOSPITAL OBSERVATION PER HR: HCPCS

## 2023-10-10 RX ADMIN — MORPHINE SULFATE 2 MG: 2 INJECTION, SOLUTION INTRAMUSCULAR; INTRAVENOUS at 15:58

## 2023-10-10 RX ADMIN — QUETIAPINE FUMARATE 200 MG: 100 TABLET ORAL at 21:15

## 2023-10-10 RX ADMIN — PROMETHAZINE HYDROCHLORIDE 25 MG: 25 TABLET ORAL at 06:19

## 2023-10-10 RX ADMIN — MORPHINE SULFATE 2 MG: 2 INJECTION, SOLUTION INTRAMUSCULAR; INTRAVENOUS at 06:18

## 2023-10-10 RX ADMIN — GABAPENTIN 750 MG: 250 SOLUTION ORAL at 00:26

## 2023-10-10 RX ADMIN — PROMETHAZINE HYDROCHLORIDE 25 MG: 25 TABLET ORAL at 16:04

## 2023-10-10 RX ADMIN — ARIPIPRAZOLE 2 MG: 2 TABLET ORAL at 17:36

## 2023-10-10 RX ADMIN — CEFEPIME 2 G: 1 INJECTION, SOLUTION INTRAVENOUS at 00:25

## 2023-10-10 RX ADMIN — CALCIUM CARBONATE (ANTACID) CHEW TAB 500 MG 500 MG: 500 CHEW TAB at 11:47

## 2023-10-10 RX ADMIN — METRONIDAZOLE 500 MG: 500 INJECTION, SOLUTION INTRAVENOUS at 06:07

## 2023-10-10 RX ADMIN — FAMOTIDINE 20 MG: 20 TABLET, FILM COATED ORAL at 11:47

## 2023-10-10 RX ADMIN — BACLOFEN 10 MG: 10 TABLET ORAL at 21:15

## 2023-10-10 RX ADMIN — BACLOFEN 10 MG: 10 TABLET ORAL at 15:29

## 2023-10-10 RX ADMIN — GABAPENTIN 750 MG: 250 SOLUTION ORAL at 17:11

## 2023-10-10 RX ADMIN — CEFEPIME 2 G: 2 INJECTION, POWDER, FOR SOLUTION INTRAVENOUS at 20:15

## 2023-10-10 RX ADMIN — WARFARIN SODIUM 7.5 MG: 5 TABLET ORAL at 17:14

## 2023-10-10 RX ADMIN — ENOXAPARIN SODIUM 40 MG: 40 INJECTION SUBCUTANEOUS at 21:15

## 2023-10-10 RX ADMIN — INSULIN LISPRO 3 UNITS: 100 INJECTION, SOLUTION INTRAVENOUS; SUBCUTANEOUS at 17:23

## 2023-10-10 RX ADMIN — VANCOMYCIN HYDROCHLORIDE 1.25 G: 1.25 INJECTION, POWDER, LYOPHILIZED, FOR SOLUTION INTRAVENOUS at 15:31

## 2023-10-10 RX ADMIN — METRONIDAZOLE 500 MG: 500 INJECTION, SOLUTION INTRAVENOUS at 22:47

## 2023-10-10 RX ADMIN — FAMOTIDINE 20 MG: 20 TABLET, FILM COATED ORAL at 21:15

## 2023-10-10 RX ADMIN — ENOXAPARIN SODIUM 40 MG: 40 INJECTION SUBCUTANEOUS at 11:45

## 2023-10-10 RX ADMIN — THIAMINE HCL TAB 100 MG 500 MG: 100 TAB at 11:46

## 2023-10-10 RX ADMIN — DOCUSATE SODIUM 100 MG: 100 CAPSULE, LIQUID FILLED ORAL at 11:47

## 2023-10-10 RX ADMIN — Medication 500 MG: at 11:45

## 2023-10-10 RX ADMIN — CEFEPIME 2 G: 1 INJECTION, SOLUTION INTRAVENOUS at 11:55

## 2023-10-10 RX ADMIN — BACLOFEN 10 MG: 10 TABLET ORAL at 11:47

## 2023-10-10 ASSESSMENT — ACTIVITIES OF DAILY LIVING (ADL)
FEEDING: INDEPENDENT
DRESSING: NEEDS ASSISTANCE
ADEQUATE_TO_COMPLETE_ADL: YES
ADL_BEFORE_ADMISSION: NEED SOME HELP
ADL_BEFORE_ADMISSION: BOTH
BATHING: NEEDS ASSISTANCE
HOW_WELL_CAN_YOU_COMPLETE_GROOMING_TASKS: NEED SOME HELP
TOILETING: INDEPENDENT
HEARING_RIGHT_EAR: DIFFICULTY WITH NOISE
HOW_WELL_CAN_YOU_USE_BATHROOM_BY_YOURSELF: INDEPENDENTLY
ADL_BEFORE_ADMISSION: BOTH
HOW_WELL_CAN_YOU_DRESS_YOURSELF: NEED SOME HELP
ASSISTIVE_DEVICES: WALKER
ADEQUATE_TO_COMPLETE_ADL: YES
HOW_WELL_CAN_YOU_BATHE_YOURSELF: NEED SOME HELP
HOW_WELL_CAN_YOU_FEED_YOURSELF: INDEPENDENTLY
ADL_BEFORE_ADMISSION: RIGHT
ASSISTIVE_DEVICE: WALKER
HEARING_LEFT_EAR: DIFFICULTY WITH NOISE
WALKS_IN_HOME: INDEPENDENTLY

## 2023-10-10 ASSESSMENT — PAIN DESCRIPTION - DESCRIPTORS: DESCRIPTORS: SHARP;BURNING;OTHER (COMMENT)

## 2023-10-10 ASSESSMENT — PAIN SCALES - GENERAL
PAINLEVEL_OUTOF10: 9
PAINLEVEL_OUTOF10: 8

## 2023-10-10 NOTE — PROGRESS NOTES
"Rut Tapia is a 57 y.o. female on day 0 of admission presenting with Cat bite of left lower leg, initial encounter.    Subjective   No events overnight. No fever. Patient seen and examined at bedside. No complaints.       Objective     Physical Exam  Constitutional:       Appearance: She is obese.   HENT:      Nose: Nose normal.      Mouth/Throat:      Mouth: Mucous membranes are moist.      Pharynx: Oropharynx is clear.   Eyes:      Pupils: Pupils are equal, round, and reactive to light.   Cardiovascular:      Rate and Rhythm: Normal rate and regular rhythm.   Pulmonary:      Effort: Pulmonary effort is normal.   Abdominal:      General: Bowel sounds are normal.   Musculoskeletal:      Cervical back: Normal range of motion.      Right lower leg: Edema present.      Comments: Erythema to anterior left shin   Skin:     General: Skin is warm.      Capillary Refill: Capillary refill takes less than 2 seconds.   Neurological:      Mental Status: She is alert.   Psychiatric:         Mood and Affect: Mood normal.     Last Recorded Vitals  Blood pressure 121/60, pulse 63, temperature 36.4 °C (97.5 °F), resp. rate 20, height 1.6 m (5' 3\"), weight 118 kg (259 lb 14.8 oz), SpO2 97 %.  Intake/Output last 3 Shifts:  I/O last 3 completed shifts:  In: 450 (3.7 mL/kg) [IV Piggyback:450]  Out: - (0 mL/kg)   Dosing Weight: 122 kg     Relevant Results                             Assessment/Plan   Principal Problem:    Cat bite of left lower leg, initial encounter    Rut is a 57-year-old female patient is alert and oriented x3 presenting to emergency department for evaluation of a cat bite to the left lower extremity that occurred on 10/2/2023.  Patient states that it has become more red and swollen and she has a history of MRSA which prompted her to come to emergency department for evaluation.  Blood cultures pending, IV antibiotics started and admission for further medical management.     Cat bite/cellulitis left lower " extremity  Admit to Dr. Nicolas Quintero  See imaging results above  Blood cultures pending  Continue IV vancomycin/cefepime/Flagyl  Tylenol as needed  Sed rate/CRP pending  Ortho consulted given tenderness -- MRI tib/fib negative for abscess  Continue IV antibiotics     2.  Diabetes  Diabetic diet  ISS mild corrective  Hypoglycemia protocol  Hold oral antidiabetic medications     3.  HTN/COPD/osteoarthritis/fibromyalgia/B12 deficiency/TIA/PEs/DVT/depression/POTS  Diabetic diet  Continue home medications as indicated      4.  DVT Ppx  SCDs  Lovenox subcutaneous  Activity as tolerated    Dispo: Continue IV antibiotics, discharge before the end of the week        I spent 35 minutes in the professional and overall care of this patient.      Nicolas Quintero, DO

## 2023-10-10 NOTE — PROGRESS NOTES
"Rut Tapia is a 57 y.o. female on day 0 of admission presenting with Cat bite of left lower leg, initial encounter.    Subjective   The patient continues to report of left lower extremity pain.       Objective   Inspection of the left leg reveals no active drainage from the cat bite site on the pretibial area.  There is resolving surrounding erythema.  Patient is  to palpation along the lower leg, but there is no evidence of fluctuance.  The MRI  Physical Exam    Last Recorded Vitals  Blood pressure 121/60, pulse 63, temperature 36.4 °C (97.5 °F), resp. rate 20, height 1.6 m (5' 3\"), weight 118 kg (259 lb 14.8 oz), SpO2 97 %.  Intake/Output last 3 Shifts:  I/O last 3 completed shifts:  In: 450 (3.7 mL/kg) [IV Piggyback:450]  Out: - (0 mL/kg)   Dosing Weight: 122 kg     Relevant Results      Scheduled medications  ARIPiprazole, 2 mg, oral, Daily  ascorbic acid, 500 mg, oral, Daily  baclofen, 10 mg, oral, TID  calcium carbonate, 500 mg, oral, Daily  cefepime, 2 g, intravenous, q8h  docusate sodium, 100 mg, oral, BID  enoxaparin, 40 mg, subcutaneous, q12h MAGY  famotidine, 20 mg, oral, BID  gabapentin, 750 mg, oral, q8h MAGY  insulin lispro, 0-5 Units, subcutaneous, TID with meals  metroNIDAZOLE, 500 mg, intravenous, q8h  multivitamin, 1 tablet, oral, Daily  QUEtiapine, 200 mg, oral, Nightly  thiamine, 500 mg, oral, Daily  vancomycin, 750 mg, intravenous, q12h  warfarin, 7.5 mg, oral, Daily      Continuous medications     PRN medications  PRN medications: acetaminophen, dextrose 10 % in water (D10W), dextrose, glucagon, morphine, nitroglycerin, promethazine  Results for orders placed or performed during the hospital encounter of 10/07/23 (from the past 24 hour(s))   POCT GLUCOSE   Result Value Ref Range    POCT Glucose 210 (H) 74 - 99 mg/dL   POCT GLUCOSE   Result Value Ref Range    POCT Glucose 163 (H) 74 - 99 mg/dL   POCT GLUCOSE   Result Value Ref Range    POCT Glucose 139 (H) 74 - 99 mg/dL   POCT " GLUCOSE   Result Value Ref Range    POCT Glucose 170 (H) 74 - 99 mg/dL   POCT GLUCOSE   Result Value Ref Range    POCT Glucose 123 (H) 74 - 99 mg/dL                            Assessment/Plan   Principal Problem:    Cat bite of left lower leg, initial encounter    The MRI report shows no evidence of a drainable fluid collection and no evidence of cellulitis.  There is no indication for surgical intervention at this time.  I recommend she continue with the IV antibiotics and lower extremity elevation.       I  Basilio Gillespie MD

## 2023-10-10 NOTE — PROGRESS NOTES
"Rut Tapia is a 57 y.o. female on day 0 of admission presenting with Cat bite of left lower leg, initial encounter.    Subjective   No events overnight. No fever. Patient down for MRI tib/fib.       Objective     Physical Exam  Constitutional:       Appearance: She is obese.   HENT:      Nose: Nose normal.      Mouth/Throat:      Mouth: Mucous membranes are moist.      Pharynx: Oropharynx is clear.   Eyes:      Pupils: Pupils are equal, round, and reactive to light.   Cardiovascular:      Rate and Rhythm: Normal rate and regular rhythm.   Pulmonary:      Effort: Pulmonary effort is normal.   Abdominal:      General: Bowel sounds are normal.   Musculoskeletal:      Cervical back: Normal range of motion.      Right lower leg: Edema present.      Comments: Erythema to anterior left shin   Skin:     General: Skin is warm.      Capillary Refill: Capillary refill takes less than 2 seconds.   Neurological:      Mental Status: She is alert.   Psychiatric:         Mood and Affect: Mood normal.     Last Recorded Vitals  Blood pressure 121/60, pulse 63, temperature 36.4 °C (97.5 °F), resp. rate 20, height 1.6 m (5' 3\"), weight 118 kg (259 lb 14.8 oz), SpO2 97 %.  Intake/Output last 3 Shifts:  I/O last 3 completed shifts:  In: 450 (3.7 mL/kg) [IV Piggyback:450]  Out: - (0 mL/kg)   Dosing Weight: 122 kg     Relevant Results                             Assessment/Plan   Principal Problem:    Cat bite of left lower leg, initial encounter    Rut is a 57-year-old female patient is alert and oriented x3 presenting to emergency department for evaluation of a cat bite to the left lower extremity that occurred on 10/2/2023.  Patient states that it has become more red and swollen and she has a history of MRSA which prompted her to come to emergency department for evaluation.  Blood cultures pending, IV antibiotics started and admission for further medical management.     Cat bite/cellulitis left lower extremity  Admit to Dr." Nicolas Quintero  See imaging results above  Blood cultures pending  Continue IV vancomycin/cefepime/Flagyl  Tylenol as needed  Sed rate/CRP pending  Ortho consulted given tenderness -- MRI tib/fib     2.  Diabetes  Diabetic diet  ISS mild corrective  Hypoglycemia protocol  Hold oral antidiabetic medications     3.  HTN/COPD/osteoarthritis/fibromyalgia/B12 deficiency/TIA/PEs/DVT/depression/POTS  Diabetic diet  Continue home medications as indicated      4.  DVT Ppx  SCDs  Lovenox subcutaneous  Activity as tolerated          I spent 35 minutes in the professional and overall care of this patient.      Nicolas Quintero, DO

## 2023-10-11 LAB
ANION GAP SERPL CALC-SCNC: 10 MMOL/L (ref 10–20)
BUN SERPL-MCNC: 12 MG/DL (ref 6–23)
CALCIUM SERPL-MCNC: 8.5 MG/DL (ref 8.6–10.3)
CHLORIDE SERPL-SCNC: 107 MMOL/L (ref 98–107)
CO2 SERPL-SCNC: 26 MMOL/L (ref 21–32)
CREAT SERPL-MCNC: 0.68 MG/DL (ref 0.5–1.05)
ERYTHROCYTE [DISTWIDTH] IN BLOOD BY AUTOMATED COUNT: 14.6 % (ref 11.5–14.5)
GFR SERPL CREATININE-BSD FRML MDRD: >90 ML/MIN/1.73M*2
GLUCOSE BLD MANUAL STRIP-MCNC: 126 MG/DL (ref 74–99)
GLUCOSE BLD MANUAL STRIP-MCNC: 137 MG/DL (ref 74–99)
GLUCOSE BLD MANUAL STRIP-MCNC: 156 MG/DL (ref 74–99)
GLUCOSE BLD MANUAL STRIP-MCNC: 172 MG/DL (ref 74–99)
GLUCOSE BLD MANUAL STRIP-MCNC: 192 MG/DL (ref 74–99)
GLUCOSE BLD MANUAL STRIP-MCNC: 201 MG/DL (ref 74–99)
GLUCOSE SERPL-MCNC: 163 MG/DL (ref 74–99)
HCT VFR BLD AUTO: 34 % (ref 36–46)
HGB BLD-MCNC: 10.6 G/DL (ref 12–16)
MCH RBC QN AUTO: 27.2 PG (ref 26–34)
MCHC RBC AUTO-ENTMCNC: 31.2 G/DL (ref 32–36)
MCV RBC AUTO: 87 FL (ref 80–100)
NRBC BLD-RTO: 0 /100 WBCS (ref 0–0)
PLATELET # BLD AUTO: 215 X10*3/UL (ref 150–450)
PMV BLD AUTO: 10 FL (ref 7.5–11.5)
POTASSIUM SERPL-SCNC: 4 MMOL/L (ref 3.5–5.3)
RBC # BLD AUTO: 3.89 X10*6/UL (ref 4–5.2)
SODIUM SERPL-SCNC: 139 MMOL/L (ref 136–145)
WBC # BLD AUTO: 5.6 X10*3/UL (ref 4.4–11.3)

## 2023-10-11 PROCEDURE — 2500000004 HC RX 250 GENERAL PHARMACY W/ HCPCS (ALT 636 FOR OP/ED): Performed by: INTERNAL MEDICINE

## 2023-10-11 PROCEDURE — G0378 HOSPITAL OBSERVATION PER HR: HCPCS

## 2023-10-11 PROCEDURE — 36415 COLL VENOUS BLD VENIPUNCTURE: CPT

## 2023-10-11 PROCEDURE — 2500000004 HC RX 250 GENERAL PHARMACY W/ HCPCS (ALT 636 FOR OP/ED): Performed by: STUDENT IN AN ORGANIZED HEALTH CARE EDUCATION/TRAINING PROGRAM

## 2023-10-11 PROCEDURE — 96372 THER/PROPH/DIAG INJ SC/IM: CPT | Performed by: INTERNAL MEDICINE

## 2023-10-11 PROCEDURE — 85027 COMPLETE CBC AUTOMATED: CPT

## 2023-10-11 PROCEDURE — 80048 BASIC METABOLIC PNL TOTAL CA: CPT

## 2023-10-11 PROCEDURE — 2500000002 HC RX 250 W HCPCS SELF ADMINISTERED DRUGS (ALT 637 FOR MEDICARE OP, ALT 636 FOR OP/ED): Performed by: INTERNAL MEDICINE

## 2023-10-11 PROCEDURE — 94640 AIRWAY INHALATION TREATMENT: CPT

## 2023-10-11 PROCEDURE — 99232 SBSQ HOSP IP/OBS MODERATE 35: CPT | Performed by: INTERNAL MEDICINE

## 2023-10-11 PROCEDURE — 2500000001 HC RX 250 WO HCPCS SELF ADMINISTERED DRUGS (ALT 637 FOR MEDICARE OP): Performed by: INTERNAL MEDICINE

## 2023-10-11 PROCEDURE — 82947 ASSAY GLUCOSE BLOOD QUANT: CPT

## 2023-10-11 RX ORDER — DICLOFENAC SODIUM 10 MG/G
4 GEL TOPICAL 4 TIMES DAILY PRN
Status: DISCONTINUED | OUTPATIENT
Start: 2023-10-11 | End: 2023-10-15 | Stop reason: HOSPADM

## 2023-10-11 RX ORDER — LEVOTHYROXINE SODIUM 50 UG/1
100 TABLET ORAL
Status: DISCONTINUED | OUTPATIENT
Start: 2023-10-11 | End: 2023-10-15 | Stop reason: HOSPADM

## 2023-10-11 RX ORDER — ALBUTEROL SULFATE 90 UG/1
2 AEROSOL, METERED RESPIRATORY (INHALATION) EVERY 6 HOURS PRN
Status: DISCONTINUED | OUTPATIENT
Start: 2023-10-11 | End: 2023-10-15 | Stop reason: HOSPADM

## 2023-10-11 RX ADMIN — ARIPIPRAZOLE 2 MG: 2 TABLET ORAL at 10:07

## 2023-10-11 RX ADMIN — CEFEPIME 2 G: 2 INJECTION, POWDER, FOR SOLUTION INTRAVENOUS at 04:59

## 2023-10-11 RX ADMIN — MORPHINE SULFATE 2 MG: 2 INJECTION, SOLUTION INTRAMUSCULAR; INTRAVENOUS at 12:16

## 2023-10-11 RX ADMIN — PROMETHAZINE HYDROCHLORIDE 25 MG: 25 TABLET ORAL at 10:07

## 2023-10-11 RX ADMIN — LEVOTHYROXINE SODIUM 100 MCG: 50 TABLET ORAL at 11:48

## 2023-10-11 RX ADMIN — BACLOFEN 10 MG: 10 TABLET ORAL at 15:11

## 2023-10-11 RX ADMIN — INSULIN LISPRO 1 UNITS: 100 INJECTION, SOLUTION INTRAVENOUS; SUBCUTANEOUS at 17:57

## 2023-10-11 RX ADMIN — GABAPENTIN 750 MG: 250 SOLUTION ORAL at 23:41

## 2023-10-11 RX ADMIN — DOCUSATE SODIUM 100 MG: 100 CAPSULE, LIQUID FILLED ORAL at 21:10

## 2023-10-11 RX ADMIN — GABAPENTIN 750 MG: 250 SOLUTION ORAL at 08:53

## 2023-10-11 RX ADMIN — VANCOMYCIN HYDROCHLORIDE 1.25 G: 1.25 INJECTION, POWDER, LYOPHILIZED, FOR SOLUTION INTRAVENOUS at 05:57

## 2023-10-11 RX ADMIN — DOCUSATE SODIUM 100 MG: 100 CAPSULE, LIQUID FILLED ORAL at 09:42

## 2023-10-11 RX ADMIN — METRONIDAZOLE 500 MG: 500 INJECTION, SOLUTION INTRAVENOUS at 09:10

## 2023-10-11 RX ADMIN — QUETIAPINE FUMARATE 200 MG: 100 TABLET ORAL at 21:10

## 2023-10-11 RX ADMIN — ENOXAPARIN SODIUM 40 MG: 40 INJECTION SUBCUTANEOUS at 09:45

## 2023-10-11 RX ADMIN — MORPHINE SULFATE 2 MG: 2 INJECTION, SOLUTION INTRAMUSCULAR; INTRAVENOUS at 23:46

## 2023-10-11 RX ADMIN — GABAPENTIN 750 MG: 250 SOLUTION ORAL at 15:30

## 2023-10-11 RX ADMIN — FAMOTIDINE 20 MG: 20 TABLET, FILM COATED ORAL at 21:10

## 2023-10-11 RX ADMIN — ENOXAPARIN SODIUM 40 MG: 40 INJECTION SUBCUTANEOUS at 21:10

## 2023-10-11 RX ADMIN — BACLOFEN 10 MG: 10 TABLET ORAL at 09:35

## 2023-10-11 RX ADMIN — THIAMINE HCL TAB 100 MG 500 MG: 100 TAB at 09:48

## 2023-10-11 RX ADMIN — METRONIDAZOLE 500 MG: 500 INJECTION, SOLUTION INTRAVENOUS at 17:57

## 2023-10-11 RX ADMIN — INSULIN LISPRO 1 UNITS: 100 INJECTION, SOLUTION INTRAVENOUS; SUBCUTANEOUS at 08:55

## 2023-10-11 RX ADMIN — CEFEPIME 2 G: 2 INJECTION, POWDER, FOR SOLUTION INTRAVENOUS at 21:12

## 2023-10-11 RX ADMIN — CEFEPIME 2 G: 2 INJECTION, POWDER, FOR SOLUTION INTRAVENOUS at 12:15

## 2023-10-11 RX ADMIN — VANCOMYCIN HYDROCHLORIDE 1.25 G: 1.25 INJECTION, POWDER, LYOPHILIZED, FOR SOLUTION INTRAVENOUS at 16:01

## 2023-10-11 RX ADMIN — BACLOFEN 10 MG: 10 TABLET ORAL at 21:10

## 2023-10-11 RX ADMIN — FAMOTIDINE 20 MG: 20 TABLET, FILM COATED ORAL at 09:47

## 2023-10-11 RX ADMIN — WARFARIN SODIUM 7.5 MG: 5 TABLET ORAL at 18:06

## 2023-10-11 RX ADMIN — Medication 1 CAPSULE: at 15:24

## 2023-10-11 RX ADMIN — MORPHINE SULFATE 2 MG: 2 INJECTION, SOLUTION INTRAMUSCULAR; INTRAVENOUS at 16:00

## 2023-10-11 RX ADMIN — ALBUTEROL SULFATE 2 PUFF: 90 AEROSOL, METERED RESPIRATORY (INHALATION) at 15:47

## 2023-10-11 RX ADMIN — GABAPENTIN 750 MG: 250 SOLUTION ORAL at 00:04

## 2023-10-11 RX ADMIN — MORPHINE SULFATE 2 MG: 2 INJECTION, SOLUTION INTRAMUSCULAR; INTRAVENOUS at 05:02

## 2023-10-11 ASSESSMENT — PAIN SCALES - GENERAL
PAINLEVEL_OUTOF10: 9
PAINLEVEL_OUTOF10: 8
PAINLEVEL_OUTOF10: 9
PAINLEVEL_OUTOF10: 7
PAINLEVEL_OUTOF10: 7
PAINLEVEL_OUTOF10: 6

## 2023-10-11 ASSESSMENT — PAIN DESCRIPTION - DESCRIPTORS: DESCRIPTORS: BURNING;SHARP

## 2023-10-11 NOTE — PROGRESS NOTES
"Rut Taipa is a 57 y.o. female on day 0 of admission presenting with Cat bite of left lower leg, initial encounter.    Subjective   No events overnight. No fever. Patient seen and examined at bedside. She still has pain and tenderness. She states she had some pus drain from her leg overnight. No fever or chills.       Objective     Physical Exam  Constitutional:       Appearance: She is obese.   HENT:      Nose: Nose normal.      Mouth/Throat:      Mouth: Mucous membranes are moist.      Pharynx: Oropharynx is clear.   Eyes:      Pupils: Pupils are equal, round, and reactive to light.   Cardiovascular:      Rate and Rhythm: Normal rate and regular rhythm.   Pulmonary:      Effort: Pulmonary effort is normal.   Abdominal:      General: Bowel sounds are normal.   Musculoskeletal:      Cervical back: Normal range of motion.      Right lower leg: Edema present.      Comments: Erythema to anterior left shin   Skin:     General: Skin is warm.      Capillary Refill: Capillary refill takes less than 2 seconds.   Neurological:      Mental Status: She is alert.   Psychiatric:         Mood and Affect: Mood normal.     Last Recorded Vitals  Blood pressure 141/63, pulse 65, temperature 36.9 °C (98.4 °F), temperature source Temporal, resp. rate 18, height 1.6 m (5' 3\"), weight 118 kg (259 lb 14.8 oz), SpO2 97 %.  Intake/Output last 3 Shifts:  I/O last 3 completed shifts:  In: 400 (3.3 mL/kg) [IV Piggyback:400]  Out: - (0 mL/kg)   Dosing Weight: 122 kg     Relevant Results                             Assessment/Plan   Principal Problem:    Cat bite of left lower leg, initial encounter    Rut is a 57-year-old female patient is alert and oriented x3 presenting to emergency department for evaluation of a cat bite to the left lower extremity that occurred on 10/2/2023.  Patient states that it has become more red and swollen and she has a history of MRSA which prompted her to come to emergency department for evaluation.  Blood " cultures pending, IV antibiotics started and admission for further medical management.     Cat bite/cellulitis left lower extremity  Admit to Dr. Nicolas Quintero  See imaging results above  Blood cultures pending  Continue IV vancomycin/cefepime/Flagyl  Tylenol as needed  Sed rate/CRP pending  Ortho consulted given tenderness -- MRI tib/fib negative for abscess  Continue IV antibiotics     2.  Diabetes  Diabetic diet  ISS mild corrective  Hypoglycemia protocol  Hold oral antidiabetic medications     3.  HTN/COPD/osteoarthritis/fibromyalgia/B12 deficiency/TIA/PEs/DVT/depression/POTS  Diabetic diet  Continue home medications as indicated      4.  DVT Ppx  SCDs  Lovenox subcutaneous  Activity as tolerated    Dispo: Continue IV antibiotics, hopefully discharge tomorrow after IV antibiotic doses        I spent 35 minutes in the professional and overall care of this patient.      Nicolas Quintero, DO

## 2023-10-12 ENCOUNTER — APPOINTMENT (OUTPATIENT)
Dept: RADIOLOGY | Facility: HOSPITAL | Age: 58
DRG: 603 | End: 2023-10-12
Payer: MEDICARE
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
## 2023-10-12 LAB
ANION GAP SERPL CALC-SCNC: 12 MMOL/L (ref 10–20)
BACTERIA BLD CULT: NORMAL
BACTERIA BLD CULT: NORMAL
BUN SERPL-MCNC: 10 MG/DL (ref 6–23)
CALCIUM SERPL-MCNC: 8.4 MG/DL (ref 8.6–10.3)
CHLORIDE SERPL-SCNC: 105 MMOL/L (ref 98–107)
CO2 SERPL-SCNC: 26 MMOL/L (ref 21–32)
CREAT SERPL-MCNC: 0.74 MG/DL (ref 0.5–1.05)
ERYTHROCYTE [DISTWIDTH] IN BLOOD BY AUTOMATED COUNT: 14.8 % (ref 11.5–14.5)
GFR SERPL CREATININE-BSD FRML MDRD: >90 ML/MIN/1.73M*2
GLUCOSE BLD MANUAL STRIP-MCNC: 127 MG/DL (ref 74–99)
GLUCOSE BLD MANUAL STRIP-MCNC: 133 MG/DL (ref 74–99)
GLUCOSE BLD MANUAL STRIP-MCNC: 148 MG/DL (ref 74–99)
GLUCOSE BLD MANUAL STRIP-MCNC: 177 MG/DL (ref 74–99)
GLUCOSE SERPL-MCNC: 222 MG/DL (ref 74–99)
HCT VFR BLD AUTO: 34.9 % (ref 36–46)
HGB BLD-MCNC: 10.5 G/DL (ref 12–16)
INR PPP: 2 (ref 0.9–1.1)
MCH RBC QN AUTO: 27.5 PG (ref 26–34)
MCHC RBC AUTO-ENTMCNC: 30.1 G/DL (ref 32–36)
MCV RBC AUTO: 91 FL (ref 80–100)
NRBC BLD-RTO: 0 /100 WBCS (ref 0–0)
PLATELET # BLD AUTO: 219 X10*3/UL (ref 150–450)
PMV BLD AUTO: 10.1 FL (ref 7.5–11.5)
POTASSIUM SERPL-SCNC: 4 MMOL/L (ref 3.5–5.3)
PROTHROMBIN TIME: 22.6 SECONDS (ref 9.8–12.8)
RBC # BLD AUTO: 3.82 X10*6/UL (ref 4–5.2)
SODIUM SERPL-SCNC: 139 MMOL/L (ref 136–145)
VANCOMYCIN SERPL-MCNC: 18.8 UG/ML (ref 5–20)
WBC # BLD AUTO: 5.6 X10*3/UL (ref 4.4–11.3)

## 2023-10-12 PROCEDURE — 2500000004 HC RX 250 GENERAL PHARMACY W/ HCPCS (ALT 636 FOR OP/ED): Performed by: INTERNAL MEDICINE

## 2023-10-12 PROCEDURE — 2500000001 HC RX 250 WO HCPCS SELF ADMINISTERED DRUGS (ALT 637 FOR MEDICARE OP): Performed by: INTERNAL MEDICINE

## 2023-10-12 PROCEDURE — 87070 CULTURE OTHR SPECIMN AEROBIC: CPT | Mod: CMCLAB,PARLAB

## 2023-10-12 PROCEDURE — 74176 CT ABD & PELVIS W/O CONTRAST: CPT | Mod: ME

## 2023-10-12 PROCEDURE — 80202 ASSAY OF VANCOMYCIN: CPT | Performed by: INTERNAL MEDICINE

## 2023-10-12 PROCEDURE — 96372 THER/PROPH/DIAG INJ SC/IM: CPT | Performed by: INTERNAL MEDICINE

## 2023-10-12 PROCEDURE — 80048 BASIC METABOLIC PNL TOTAL CA: CPT

## 2023-10-12 PROCEDURE — 2500000002 HC RX 250 W HCPCS SELF ADMINISTERED DRUGS (ALT 637 FOR MEDICARE OP, ALT 636 FOR OP/ED): Performed by: INTERNAL MEDICINE

## 2023-10-12 PROCEDURE — 36415 COLL VENOUS BLD VENIPUNCTURE: CPT

## 2023-10-12 PROCEDURE — 85027 COMPLETE CBC AUTOMATED: CPT

## 2023-10-12 PROCEDURE — 85610 PROTHROMBIN TIME: CPT

## 2023-10-12 PROCEDURE — 1100000001 HC PRIVATE ROOM DAILY

## 2023-10-12 PROCEDURE — 2500000004 HC RX 250 GENERAL PHARMACY W/ HCPCS (ALT 636 FOR OP/ED): Performed by: STUDENT IN AN ORGANIZED HEALTH CARE EDUCATION/TRAINING PROGRAM

## 2023-10-12 PROCEDURE — 36415 COLL VENOUS BLD VENIPUNCTURE: CPT | Performed by: INTERNAL MEDICINE

## 2023-10-12 PROCEDURE — 99232 SBSQ HOSP IP/OBS MODERATE 35: CPT | Performed by: INTERNAL MEDICINE

## 2023-10-12 PROCEDURE — 74176 CT ABD & PELVIS W/O CONTRAST: CPT | Performed by: STUDENT IN AN ORGANIZED HEALTH CARE EDUCATION/TRAINING PROGRAM

## 2023-10-12 PROCEDURE — 82947 ASSAY GLUCOSE BLOOD QUANT: CPT

## 2023-10-12 RX ORDER — SUMATRIPTAN 50 MG/1
100 TABLET, FILM COATED ORAL EVERY 2 HOUR PRN
Status: DISCONTINUED | OUTPATIENT
Start: 2023-10-12 | End: 2023-10-12

## 2023-10-12 RX ORDER — MEROPENEM 1 G/1
1 INJECTION, POWDER, FOR SOLUTION INTRAVENOUS EVERY 8 HOURS
Status: DISCONTINUED | OUTPATIENT
Start: 2023-10-12 | End: 2023-10-15 | Stop reason: HOSPADM

## 2023-10-12 RX ORDER — MUPIROCIN 20 MG/G
OINTMENT TOPICAL 3 TIMES DAILY
Status: DISCONTINUED | OUTPATIENT
Start: 2023-10-12 | End: 2023-10-15 | Stop reason: HOSPADM

## 2023-10-12 RX ORDER — SUMATRIPTAN 50 MG/1
50 TABLET, FILM COATED ORAL EVERY 2 HOUR PRN
Status: DISCONTINUED | OUTPATIENT
Start: 2023-10-12 | End: 2023-10-15 | Stop reason: HOSPADM

## 2023-10-12 RX ADMIN — ACETAMINOPHEN 650 MG: 325 TABLET ORAL at 20:59

## 2023-10-12 RX ADMIN — CEFEPIME 2 G: 2 INJECTION, POWDER, FOR SOLUTION INTRAVENOUS at 05:15

## 2023-10-12 RX ADMIN — VANCOMYCIN HYDROCHLORIDE 1.25 G: 1.25 INJECTION, POWDER, LYOPHILIZED, FOR SOLUTION INTRAVENOUS at 05:46

## 2023-10-12 RX ADMIN — PROMETHAZINE HYDROCHLORIDE 25 MG: 25 TABLET ORAL at 17:22

## 2023-10-12 RX ADMIN — MORPHINE SULFATE 2 MG: 2 INJECTION, SOLUTION INTRAMUSCULAR; INTRAVENOUS at 05:20

## 2023-10-12 RX ADMIN — QUETIAPINE FUMARATE 200 MG: 100 TABLET ORAL at 21:00

## 2023-10-12 RX ADMIN — DOCUSATE SODIUM 100 MG: 100 CAPSULE, LIQUID FILLED ORAL at 20:59

## 2023-10-12 RX ADMIN — BACLOFEN 10 MG: 10 TABLET ORAL at 14:33

## 2023-10-12 RX ADMIN — FAMOTIDINE 20 MG: 20 TABLET, FILM COATED ORAL at 08:29

## 2023-10-12 RX ADMIN — GABAPENTIN 750 MG: 250 SOLUTION ORAL at 15:45

## 2023-10-12 RX ADMIN — WARFARIN SODIUM 7.5 MG: 5 TABLET ORAL at 18:38

## 2023-10-12 RX ADMIN — Medication 500 MG: at 08:34

## 2023-10-12 RX ADMIN — MORPHINE SULFATE 2 MG: 2 INJECTION, SOLUTION INTRAMUSCULAR; INTRAVENOUS at 12:19

## 2023-10-12 RX ADMIN — BACLOFEN 10 MG: 10 TABLET ORAL at 20:59

## 2023-10-12 RX ADMIN — CEFEPIME 2 G: 2 INJECTION, POWDER, FOR SOLUTION INTRAVENOUS at 12:06

## 2023-10-12 RX ADMIN — FAMOTIDINE 20 MG: 20 TABLET, FILM COATED ORAL at 20:59

## 2023-10-12 RX ADMIN — BACLOFEN 10 MG: 10 TABLET ORAL at 08:29

## 2023-10-12 RX ADMIN — DICLOFENAC SODIUM 1 APPLICATION: 10 GEL TOPICAL at 14:34

## 2023-10-12 RX ADMIN — MUPIROCIN: 20 OINTMENT TOPICAL at 21:00

## 2023-10-12 RX ADMIN — LEVOTHYROXINE SODIUM 100 MCG: 50 TABLET ORAL at 06:29

## 2023-10-12 RX ADMIN — MORPHINE SULFATE 2 MG: 2 INJECTION, SOLUTION INTRAMUSCULAR; INTRAVENOUS at 18:04

## 2023-10-12 RX ADMIN — GABAPENTIN 750 MG: 250 SOLUTION ORAL at 08:18

## 2023-10-12 RX ADMIN — Medication 1 CAPSULE: at 08:30

## 2023-10-12 RX ADMIN — ENOXAPARIN SODIUM 40 MG: 40 INJECTION SUBCUTANEOUS at 08:28

## 2023-10-12 RX ADMIN — INSULIN LISPRO 1 UNITS: 100 INJECTION, SOLUTION INTRAVENOUS; SUBCUTANEOUS at 08:19

## 2023-10-12 RX ADMIN — DOCUSATE SODIUM 100 MG: 100 CAPSULE, LIQUID FILLED ORAL at 08:28

## 2023-10-12 RX ADMIN — ARIPIPRAZOLE 2 MG: 2 TABLET ORAL at 08:29

## 2023-10-12 RX ADMIN — METRONIDAZOLE 500 MG: 500 INJECTION, SOLUTION INTRAVENOUS at 02:02

## 2023-10-12 RX ADMIN — METRONIDAZOLE 500 MG: 500 INJECTION, SOLUTION INTRAVENOUS at 08:22

## 2023-10-12 RX ADMIN — MEROPENEM 1 G: 1 INJECTION, POWDER, FOR SOLUTION INTRAVENOUS at 18:37

## 2023-10-12 RX ADMIN — THIAMINE HCL TAB 100 MG 500 MG: 100 TAB at 08:29

## 2023-10-12 ASSESSMENT — PAIN SCALES - GENERAL
PAINLEVEL_OUTOF10: 7
PAINLEVEL_OUTOF10: 5 - MODERATE PAIN
PAINLEVEL_OUTOF10: 10 - WORST POSSIBLE PAIN
PAINLEVEL_OUTOF10: 6
PAINLEVEL_OUTOF10: 7
PAINLEVEL_OUTOF10: 7

## 2023-10-12 ASSESSMENT — PAIN DESCRIPTION - DESCRIPTORS: DESCRIPTORS: BURNING;SHARP

## 2023-10-12 ASSESSMENT — PAIN - FUNCTIONAL ASSESSMENT: PAIN_FUNCTIONAL_ASSESSMENT: 0-10

## 2023-10-12 NOTE — PROGRESS NOTES
Vancomycin Dosing by Pharmacy- FOLLOW UP    Rut Tapia is a 57 y.o. year old female who Pharmacy has been consulted for vancomycin dosing for cellulitis, skin and soft tissue. Based on the patient's indication and renal status this patient is being dosed based on a goal AUC of 400-600.     Renal function is currently stable.    Current vancomycin dose: 1250 mg given every 12 hours    Estimated vancomycin AUC on current dose: 465 mg/L.hr     Visit Vitals  /63   Pulse 71   Temp 36.1 °C (97 °F) (Tympanic)   Resp 18        Lab Results   Component Value Date    CREATININE 0.74 10/12/2023    CREATININE 0.68 10/11/2023    CREATININE 0.72 10/10/2023    CREATININE 0.80 10/09/2023        I/O last 3 completed shifts:  In: 300 (2.5 mL/kg) [IV Piggyback:300]  Out: - (0 mL/kg)   Dosing Weight: 122 kg     Assessment/Plan    Day #6 of vancomycin therapy  Within goal AUC range. Continue current vancomycin regimen.    This dosing regimen is predicted by [employer] to result in the following pharmacokinetic parameters:  AUC24,ss: 465 mg/L.hr  Probability of AUC24 > 400: 100 %  Ctrough,ss: 13.6 mg/L  Probability of Ctrough,ss > 20: 0 %  Probability of nephrotoxicity (Lodise TRISHA 2009): 9 %    The next level will be obtained on 10/19 with AM labs. May be obtained sooner if clinically indicated.   Will continue to monitor renal function daily while on vancomycin and order serum creatinine at least every 48 hours if not already ordered.  Follow for continued vancomycin needs, clinical response, and signs/symptoms of toxicity.     Please call with any questions.  Amaris Boyer, PharmD, BCCCP  [id number]

## 2023-10-12 NOTE — PROGRESS NOTES
"Rut Tapia is a 57 y.o. female on day 0 of admission presenting with Cat bite of left lower leg, initial encounter.    Subjective   No events overnight. No fever. Patient seen and examined at bedside. She still has pain and tenderness. No fever or chills. She does have pus coming from her wound today. It was covered overnight by nursing. She also has right flank pain similar to kidney stones in the past.       Objective     Physical Exam  Constitutional:       Appearance: She is obese.   HENT:      Nose: Nose normal.      Mouth/Throat:      Mouth: Mucous membranes are moist.      Pharynx: Oropharynx is clear.   Eyes:      Pupils: Pupils are equal, round, and reactive to light.   Cardiovascular:      Rate and Rhythm: Normal rate and regular rhythm.   Pulmonary:      Effort: Pulmonary effort is normal.   Abdominal:      General: Bowel sounds are normal.   Musculoskeletal:      Cervical back: Normal range of motion.      Right lower leg: Edema present.      Comments: Erythema to anterior left shin   Skin:     General: Skin is warm.      Capillary Refill: Capillary refill takes less than 2 seconds.   Neurological:      Mental Status: She is alert.   Psychiatric:         Mood and Affect: Mood normal.     Last Recorded Vitals  Blood pressure 138/63, pulse 71, temperature 36.1 °C (97 °F), temperature source Tympanic, resp. rate 18, height 1.6 m (5' 3\"), weight 118 kg (259 lb 14.8 oz), SpO2 96 %.  Intake/Output last 3 Shifts:  I/O last 3 completed shifts:  In: 300 (2.5 mL/kg) [IV Piggyback:300]  Out: - (0 mL/kg)   Dosing Weight: 122 kg     Relevant Results                             Assessment/Plan   Principal Problem:    Cat bite of left lower leg, initial encounter    Rut is a 57-year-old female patient is alert and oriented x3 presenting to emergency department for evaluation of a cat bite to the left lower extremity that occurred on 10/2/2023.  Patient states that it has become more red and swollen and she has " a history of MRSA which prompted her to come to emergency department for evaluation.  Blood cultures pending, IV antibiotics started and admission for further medical management.     Cat bite/cellulitis left lower extremity  Admit to Dr. Nicolas Quintero  See imaging results above  Blood cultures pending  Continue IV vancomycin/cefepime/Flagyl  Tylenol as needed  Sed rate/CRP pending  Ortho consulted given tenderness -- MRI tib/fib negative for abscess  Continue IV antibiotics  Will obtain wound culture now that area open  Consult ID, appreciate recs     2.  Right flank pain  Check CT a/p w/o to r/o nephrolithiasis    3.  Diabetes  Diabetic diet  ISS mild corrective  Hypoglycemia protocol  Hold oral antidiabetic medications     4.  HTN/COPD/osteoarthritis/fibromyalgia/B12 deficiency/TIA/PEs/DVT/depression/POTS  Diabetic diet  Continue home medications as indicated      5.  DVT Ppx  SCDs  Lovenox subcutaneous  Activity as tolerated    Dispo: Continue IV antibiotics, await ID input       I spent 35 minutes in the professional and overall care of this patient.      Nicolas Quintero, DO

## 2023-10-12 NOTE — CONSULTS
Consults    Reason For Consult  Left leg cellulitis    History Of Present Illness  Rut Tapia is a 57 y.o. female who was bitten by her cat on 10/2/2023.  She came to the emergency department on 10/8/2023 with left leg redness.  She was placed on vancomycin, cefepime, and Flagyl.  She had an MRI done on 10/9/2023 which showed soft tissue swelling.  The site opened up and started to drain today so a wound culture was collected.  She is being seen by orthopedics.  She denies nausea vomiting fevers or diarrhea     Past Medical History  She has a past medical history of Arthritis, Chronic vascular disorders of intestine (CMS/HCC), Narcolepsy without cataplexy, Personal history of other diseases of the nervous system and sense organs, Personal history of other infectious and parasitic diseases, Personal history of other mental and behavioral disorders, Personal history of other venous thrombosis and embolism, Personal history of urinary calculi, and Type 2 diabetes mellitus with diabetic neuropathy, unspecified (CMS/[facility]).    Surgical History  She has a past surgical history that includes Gastric bypass (2013);  section, classic (2013); Oophorectomy (2013); Other surgical history (2022); Cholecystectomy (2022); Appendectomy (2022); Hysterectomy (2022); Other surgical history (10/14/2022); Other surgical history (10/14/2022); Other surgical history (10/14/2022); Other surgical history (10/14/2022); Other surgical history (10/14/2022); Other surgical history (2022); Other surgical history (2022); Other surgical history (2022); Other surgical history (2021); Other surgical history (2021); Other surgical history (2019); Other surgical history (2019); and Other surgical history (2019).     Social History  She reports that she has never smoked. She has never been exposed to tobacco smoke. She has never used smokeless tobacco.  She reports that she does not drink alcohol and does not use drugs.    She lives with her     Family History  Family History   Problem Relation Name Age of Onset    Other (blood pressure) Mother          isolated elevated    Other (blood pressure) Father          isolated elevated    Diabetes Father      Other (cardiac problems) Father          reported previous    Diabetes Sister      Ovarian cancer Sister      Other (malignant carcinoma) Sister          of the breast    Diabetes Brother      Sleep apnea Brother          nonorganic    Bipolar disorder Son      Schizophrenia Son      Breast cancer Mother's Sister      Breast cancer Maternal Grandmother      Colon cancer Maternal Grandfather      Breast cancer Maternal Great-Grandmother          Allergies  Aspirin; Buprenorphine; Citalopram; Meperidine; Nsaids (non-steroidal anti-inflammatory drug); Penicillins; Prochlorperazine; Abilify [aripiprazole]; Amitriptyline; Azithromycin; Methylprednisolone; Petrolatum,white; and Tobramycin-dexamethasone    Review of Systems  Left leg pain down to her foot     Physical Exam  General: no acute distress, lying in bed  HEENT: pink pharynx  CVS: RRR  Resp: decreased breath sounds in bases  ABD: soft, NT, ND  EXT: Bilateral lower extremity lymphedema; there is venous stasis; there is a small area of induration around the left leg traumatic ulcers as well as slight tenderness  Skin: no rash        Last Recorded Vitals  /70   Pulse 71   Temp 36.5 °C (97.7 °F)   Resp 18   Wt 118 kg (259 lb 14.8 oz)   SpO2 95%     Relevant Results  10/7/2023 creatinine 0.9; white blood count 6.6  10/7/2023 blood culture showed no growth  10 7/7/2023 left leg x-ray of the tibia and fibula showed no acute issues  10/9/2023 MRI of the left leg shows soft tissue swelling     Assessment/Plan   Left leg Bite complicated by cellulitis.  I have personally and independently reviewed and interpreted her laboratory tests, imaging studies, and  the documentation from other healthcare providers.  There has been improvement in the cellulitis but she continues to have left lower extremity edema and erythema which may be multifactorial.  I suspect that her lymphedema and venous stasis are contributing.  I have discussed her medical care with the wound care nurse.  I will switch her antibiotic to meropenem.  My plan will be to discharge her soon on oral antibiotics, such as cefuroxime and Flagyl, but we will see what the wound culture shows.    -Change antibiotics to meropenem  -Monitor for side effects from meropenem which can include rash, diarrhea, and bone marrow suppression  -Await wound culture  -Encourage lower extremity elevation  -Optimize local wound care and compression    Other issues:  #Right flank pain, for which she is having a CT abdomen and pelvis  #Type II diabetes, which is chronic condition that puts her at risk for infections   #HTN, which is a chronic condition for her  #COPD, which is a chronic condition for her  #fibromyalgia, which is a chronic condition for her  #B12 deficiency  #History of TIA  #History of PEs/DVT, for which she is on anticoagulation with coumadin   #Reflex sympathetic dystrophy, which may can be contributing to her pain     Emiliano Kaba MD

## 2023-10-12 NOTE — CONSULTS
“Wound” Ostomy Consultation        1. Chief Complaint: wound check and evaluation   2. History of Present Illness:   LLE cat bite and ABD skin tear   3. Past Medical History: Reviewed   4. Past Surgical History: Reviewed  5. Allergies:     Reviewed  6. Social/Family History: Reviewed  7. Medications:  Reviewed  8. Labs/Data/Test Results: Reviewed   9. Progress Notes:  Reviewed     10. System Review: system review was performed with these findings:   Integumentary: Will be described below    11.  Physical Examination:   Constitutional: About stated age and obese; No weight loss/fevers/chills.    Psych: Alert, oriented to person/place/time, intact memory; normal limit affect/judgment/insight   Respiratory: Symmetrical expansion/effort; No cough/shortness of breath   Cardiovascular: No chest pain; BLE lymphedema; She has pump but does not use them, explained she can turn the pressure down and wear longer for the same effect.   Neuro: Normal limit sensation  Musculoskeletal: Normal limit symmetry/range of motion; Normal limit muscle strength/tone of all extremities   Integumentary: Normal skin color, texture, and turgor, No dry/scaly/cracked skin; No ecchymotic areas; No friable skin; No rash/lesions/excoriation/burns; No diaphoresis; No jaundice, terrance, pallor skin;   LLE 2 linear dry scabs noted, lower leg + erythema present,slight warmth, + edema, and slight induration noted   Ears: Normal limit hearing  Neck: Normal limit appearance/movements; Trachea midline;   Abdominal: Soft, no distension/tenderness; normal limit bowel sounds, no ostomy/hernia/fistula; small opened area in a crease of her abdomen, pink tissue, no odor or drainage noted.     12. Nutritional Status:   Appetite: good  Diet: ADA    13. Wound Pain/Discomfort: No     Assessment/Plan/Treatment Recommendations:     Abdomen and LLE: wash with wound wash, dry well, apply Bactroban 2% Ointment; 2 times a day, Abdomen cover with Intra dry and LLE cover with  dry gauze and secure tubifast.       Education provided; Treatment demonstrated; Questions answered  D/W RN / MD and ID MD   Orders received     I have spent 20 minutes with the patient for physical and wound assessment, wound cleaning, dressing demonstration and answering questions. More than 50% of the time spent with the patient included education/counselling/coordination of care.     Margie Valerio RN WOCN

## 2023-10-12 NOTE — PROGRESS NOTES
10/12/23 1427   Discharge Planning   Assistance Needed walker   Patient expects to be discharged to: [facility]     10/12/2023  Followed up with pt in room, introduced self.  Pt remains on IV antibiotics.  Per NP, ID to be consulted.   Pt is from home, lives with spouse.in an apartment.  Uses a walker.   Pt stated she would like [facility] upon discharge.  Feels she needs more help. (Pt stated that she has had a picc line and home iv's in the past). Questions answered and support provided.  CT Team will continue to follow for needs.    Anabel Mercado RN TCC

## 2023-10-12 NOTE — PROGRESS NOTES
"Rut Tapia is a 57 y.o. female on day 0 of admission presenting with Cat bite of left lower leg, initial encounter.    Subjective   Patient has persistent pain left pretibial area.  Feels she has had marked improvement in erythema and swelling of the leg.  She relates that intermittently there still is drainage.       Objective 2 linear wounds pretibial area.  Dry today surrounding hyperemia remains but no fluctuance noted.  Unable to express any fluid or drainage from either of these deep scratch/wounds.  No cultures have been obtained to date.    Physical Exam    Last Recorded Vitals  Blood pressure 138/63, pulse 71, temperature 36.1 °C (97 °F), temperature source Tympanic, resp. rate 18, height 1.6 m (5' 3\"), weight 118 kg (259 lb 14.8 oz), SpO2 96 %.  Intake/Output last 3 Shifts:  I/O last 3 completed shifts:  In: 300 (2.5 mL/kg) [IV Piggyback:300]  Out: - (0 mL/kg)   Dosing Weight: 122 kg     Relevant Results      Scheduled medications  ARIPiprazole, 2 mg, oral, Daily  ascorbic acid, 500 mg, oral, Daily  B complex-vitamin C-folic acid, 1 capsule, oral, Daily  baclofen, 10 mg, oral, TID  calcium carbonate, 500 mg, oral, Daily  cefepime (Maxipime) 2 g in dextrose 5 % 100 mL IV, 2 g, intravenous, q8h  docusate sodium, 100 mg, oral, BID  famotidine, 20 mg, oral, BID  gabapentin, 750 mg, oral, q8h MAGY  insulin lispro, 0-5 Units, subcutaneous, TID with meals  levothyroxine, 100 mcg, oral, Daily  metroNIDAZOLE, 500 mg, intravenous, q8h  multivitamin, 1 tablet, oral, Daily  QUEtiapine, 200 mg, oral, Nightly  thiamine, 500 mg, oral, Daily  vancomycin, 1,250 mg, intravenous, q12h  warfarin, 7.5 mg, oral, Daily      Continuous medications     PRN medications  PRN medications: acetaminophen, albuterol, dextrose 10 % in water (D10W), dextrose, diclofenac sodium, glucagon, morphine, nitroglycerin, promethazine  Results for orders placed or performed during the hospital encounter of 10/07/23 (from the past 24 hour(s)) "   POCT GLUCOSE   Result Value Ref Range    POCT Glucose 192 (H) 74 - 99 mg/dL   POCT GLUCOSE   Result Value Ref Range    POCT Glucose 201 (H) 74 - 99 mg/dL   POCT GLUCOSE   Result Value Ref Range    POCT Glucose 172 (H) 74 - 99 mg/dL   CBC   Result Value Ref Range    WBC 5.6 4.4 - 11.3 x10*3/uL    nRBC 0.0 0.0 - 0.0 /100 WBCs    RBC 3.82 (L) 4.00 - 5.20 x10*6/uL    Hemoglobin 10.5 (L) 12.0 - 16.0 g/dL    Hematocrit 34.9 (L) 36.0 - 46.0 %    MCV 91 80 - 100 fL    MCH 27.5 26.0 - 34.0 pg    MCHC 30.1 (L) 32.0 - 36.0 g/dL    RDW 14.8 (H) 11.5 - 14.5 %    Platelets 219 150 - 450 x10*3/uL    MPV 10.1 7.5 - 11.5 fL   Basic Metabolic Panel   Result Value Ref Range    Glucose 222 (H) 74 - 99 mg/dL    Sodium 139 136 - 145 mmol/L    Potassium 4.0 3.5 - 5.3 mmol/L    Chloride 105 98 - 107 mmol/L    Bicarbonate 26 21 - 32 mmol/L    Anion Gap 12 10 - 20 mmol/L    Urea Nitrogen 10 6 - 23 mg/dL    Creatinine 0.74 0.50 - 1.05 mg/dL    eGFR >90 >60 mL/min/1.73m*2    Calcium 8.4 (L) 8.6 - 10.3 mg/dL   POCT GLUCOSE   Result Value Ref Range    POCT Glucose 177 (H) 74 - 99 mg/dL   Vancomycin   Result Value Ref Range    Vancomycin 18.8 5.0 - 20.0 ug/mL   Protime-INR   Result Value Ref Range    Protime 22.6 (H) 9.8 - 12.8 seconds    INR 2.0 (H) 0.9 - 1.1                            Assessment/Plan   Principal Problem:    Cat bite of left lower leg, initial encounter  Active Problems:    Cat bite, initial encounter    Patient is improving still having persistent pain at the areas of the cat scratches.  Culture of this area is of equivocal value without significant drainage or an actual fluid pocket.  Discussed with patient recommending cleaned of the areas with alcohol followed by fairly aggressive probing the area with a sterile culture swab.  This specimen which now has just bloody fluid on it will be sent for routine culture.  Discussed with the patient encourage elevation and ankle pumps and continued bandaging as well as IV  antibiotics.       I spent  minutes in the professional and overall care of this patient.      Frank Garsia MD

## 2023-10-13 LAB
ANION GAP SERPL CALC-SCNC: 12 MMOL/L (ref 10–20)
BUN SERPL-MCNC: 11 MG/DL (ref 6–23)
CALCIUM SERPL-MCNC: 8.7 MG/DL (ref 8.6–10.3)
CHLORIDE SERPL-SCNC: 105 MMOL/L (ref 98–107)
CO2 SERPL-SCNC: 28 MMOL/L (ref 21–32)
CREAT SERPL-MCNC: 0.75 MG/DL (ref 0.5–1.05)
ERYTHROCYTE [DISTWIDTH] IN BLOOD BY AUTOMATED COUNT: 14.8 % (ref 11.5–14.5)
GFR SERPL CREATININE-BSD FRML MDRD: >90 ML/MIN/1.73M*2
GLUCOSE BLD MANUAL STRIP-MCNC: 139 MG/DL (ref 74–99)
GLUCOSE BLD MANUAL STRIP-MCNC: 156 MG/DL (ref 74–99)
GLUCOSE BLD MANUAL STRIP-MCNC: 161 MG/DL (ref 74–99)
GLUCOSE BLD MANUAL STRIP-MCNC: 168 MG/DL (ref 74–99)
GLUCOSE SERPL-MCNC: 126 MG/DL (ref 74–99)
HCT VFR BLD AUTO: 37.6 % (ref 36–46)
HGB BLD-MCNC: 11.5 G/DL (ref 12–16)
INR PPP: 1.8 (ref 0.9–1.1)
MCH RBC QN AUTO: 27.4 PG (ref 26–34)
MCHC RBC AUTO-ENTMCNC: 30.6 G/DL (ref 32–36)
MCV RBC AUTO: 90 FL (ref 80–100)
NRBC BLD-RTO: 0 /100 WBCS (ref 0–0)
PLATELET # BLD AUTO: 229 X10*3/UL (ref 150–450)
PMV BLD AUTO: 10.2 FL (ref 7.5–11.5)
POTASSIUM SERPL-SCNC: 3.9 MMOL/L (ref 3.5–5.3)
PROTHROMBIN TIME: 20.3 SECONDS (ref 9.8–12.8)
RBC # BLD AUTO: 4.19 X10*6/UL (ref 4–5.2)
SODIUM SERPL-SCNC: 141 MMOL/L (ref 136–145)
WBC # BLD AUTO: 5.5 X10*3/UL (ref 4.4–11.3)

## 2023-10-13 PROCEDURE — 2500000004 HC RX 250 GENERAL PHARMACY W/ HCPCS (ALT 636 FOR OP/ED)

## 2023-10-13 PROCEDURE — 2500000004 HC RX 250 GENERAL PHARMACY W/ HCPCS (ALT 636 FOR OP/ED): Performed by: INTERNAL MEDICINE

## 2023-10-13 PROCEDURE — 2500000002 HC RX 250 W HCPCS SELF ADMINISTERED DRUGS (ALT 637 FOR MEDICARE OP, ALT 636 FOR OP/ED): Performed by: INTERNAL MEDICINE

## 2023-10-13 PROCEDURE — 97161 PT EVAL LOW COMPLEX 20 MIN: CPT | Mod: GP

## 2023-10-13 PROCEDURE — 97165 OT EVAL LOW COMPLEX 30 MIN: CPT | Mod: GO

## 2023-10-13 PROCEDURE — 1100000001 HC PRIVATE ROOM DAILY

## 2023-10-13 PROCEDURE — 82947 ASSAY GLUCOSE BLOOD QUANT: CPT

## 2023-10-13 PROCEDURE — 2500000001 HC RX 250 WO HCPCS SELF ADMINISTERED DRUGS (ALT 637 FOR MEDICARE OP)

## 2023-10-13 PROCEDURE — 82374 ASSAY BLOOD CARBON DIOXIDE: CPT

## 2023-10-13 PROCEDURE — 96372 THER/PROPH/DIAG INJ SC/IM: CPT | Performed by: INTERNAL MEDICINE

## 2023-10-13 PROCEDURE — 85610 PROTHROMBIN TIME: CPT | Performed by: INTERNAL MEDICINE

## 2023-10-13 PROCEDURE — 99232 SBSQ HOSP IP/OBS MODERATE 35: CPT | Performed by: STUDENT IN AN ORGANIZED HEALTH CARE EDUCATION/TRAINING PROGRAM

## 2023-10-13 PROCEDURE — 2500000001 HC RX 250 WO HCPCS SELF ADMINISTERED DRUGS (ALT 637 FOR MEDICARE OP): Performed by: INTERNAL MEDICINE

## 2023-10-13 PROCEDURE — 36415 COLL VENOUS BLD VENIPUNCTURE: CPT

## 2023-10-13 PROCEDURE — 36415 COLL VENOUS BLD VENIPUNCTURE: CPT | Performed by: INTERNAL MEDICINE

## 2023-10-13 PROCEDURE — 85027 COMPLETE CBC AUTOMATED: CPT

## 2023-10-13 RX ORDER — WARFARIN 10 MG/1
10 TABLET ORAL DAILY
COMMUNITY
End: 2024-01-30

## 2023-10-13 RX ORDER — SENNOSIDES 8.6 MG/1
1 TABLET ORAL AS NEEDED
Status: DISCONTINUED | OUTPATIENT
Start: 2023-10-13 | End: 2023-10-15 | Stop reason: HOSPADM

## 2023-10-13 RX ORDER — ERGOCALCIFEROL (VITAMIN D2) 200 MCG/ML
DROPS ORAL DAILY
COMMUNITY
End: 2024-05-17 | Stop reason: ALTCHOICE

## 2023-10-13 RX ORDER — INSULIN GLARGINE 100 [IU]/ML
17 INJECTION, SOLUTION SUBCUTANEOUS NIGHTLY
COMMUNITY

## 2023-10-13 RX ORDER — ACETAMINOPHEN 500MG/15ML
500 LIQUID (ML) ORAL
COMMUNITY

## 2023-10-13 RX ORDER — TORSEMIDE 20 MG/1
20 TABLET ORAL DAILY
COMMUNITY

## 2023-10-13 RX ORDER — SODIUM CHLORIDE 9 MG/ML
75 INJECTION, SOLUTION INTRAVENOUS CONTINUOUS
Status: DISCONTINUED | OUTPATIENT
Start: 2023-10-13 | End: 2023-10-15 | Stop reason: HOSPADM

## 2023-10-13 RX ORDER — DICLOFENAC SODIUM 10 MG/G
4 GEL TOPICAL 4 TIMES DAILY PRN
COMMUNITY

## 2023-10-13 RX ORDER — BACLOFEN 10 MG/1
10 TABLET ORAL 3 TIMES DAILY
COMMUNITY
End: 2023-11-21 | Stop reason: SDUPTHER

## 2023-10-13 RX ORDER — INSULIN PUMP SYRINGE, 3 ML
1 EACH MISCELLANEOUS AS NEEDED
COMMUNITY

## 2023-10-13 RX ORDER — FERROUS GLUCONATE 256(28)MG
27 TABLET ORAL DAILY
COMMUNITY

## 2023-10-13 RX ORDER — LEVOTHYROXINE SODIUM 100 UG/1
100 TABLET ORAL
COMMUNITY

## 2023-10-13 RX ORDER — THIAMINE HCL 50 MG
TABLET ORAL 2 TIMES DAILY
COMMUNITY

## 2023-10-13 RX ORDER — QUETIAPINE FUMARATE 200 MG/1
200 TABLET, FILM COATED ORAL NIGHTLY
COMMUNITY

## 2023-10-13 RX ORDER — SENNOSIDES 8.6 MG/1
1 TABLET ORAL DAILY
COMMUNITY

## 2023-10-13 RX ORDER — FAMOTIDINE 40 MG/1
40 TABLET, FILM COATED ORAL 2 TIMES DAILY
COMMUNITY

## 2023-10-13 RX ORDER — INSULIN LISPRO 100 [IU]/ML
INJECTION, SOLUTION INTRAVENOUS; SUBCUTANEOUS
COMMUNITY

## 2023-10-13 RX ORDER — ARIPIPRAZOLE 2 MG/1
2 TABLET ORAL DAILY
COMMUNITY
End: 2024-03-27 | Stop reason: ALTCHOICE

## 2023-10-13 RX ADMIN — FAMOTIDINE 20 MG: 20 TABLET, FILM COATED ORAL at 22:13

## 2023-10-13 RX ADMIN — Medication 1 CAPSULE: at 10:38

## 2023-10-13 RX ADMIN — DOCUSATE SODIUM 100 MG: 100 CAPSULE, LIQUID FILLED ORAL at 22:10

## 2023-10-13 RX ADMIN — MEROPENEM 1 G: 1 INJECTION, POWDER, FOR SOLUTION INTRAVENOUS at 02:53

## 2023-10-13 RX ADMIN — SODIUM CHLORIDE 75 ML/HR: 900 INJECTION INTRAVENOUS at 15:09

## 2023-10-13 RX ADMIN — MUPIROCIN: 20 OINTMENT TOPICAL at 15:10

## 2023-10-13 RX ADMIN — GABAPENTIN 750 MG: 250 SOLUTION ORAL at 01:03

## 2023-10-13 RX ADMIN — QUETIAPINE FUMARATE 200 MG: 100 TABLET ORAL at 22:10

## 2023-10-13 RX ADMIN — MORPHINE SULFATE 2 MG: 2 INJECTION, SOLUTION INTRAMUSCULAR; INTRAVENOUS at 02:53

## 2023-10-13 RX ADMIN — THIAMINE HCL TAB 100 MG 500 MG: 100 TAB at 09:13

## 2023-10-13 RX ADMIN — BACLOFEN 10 MG: 10 TABLET ORAL at 15:09

## 2023-10-13 RX ADMIN — PROMETHAZINE HYDROCHLORIDE 25 MG: 25 TABLET ORAL at 12:38

## 2023-10-13 RX ADMIN — MEROPENEM 1 G: 1 INJECTION, POWDER, FOR SOLUTION INTRAVENOUS at 10:40

## 2023-10-13 RX ADMIN — MORPHINE SULFATE 2 MG: 2 INJECTION, SOLUTION INTRAMUSCULAR; INTRAVENOUS at 22:09

## 2023-10-13 RX ADMIN — CALCIUM CARBONATE (ANTACID) CHEW TAB 500 MG 500 MG: 500 CHEW TAB at 09:12

## 2023-10-13 RX ADMIN — Medication 500 MG: at 09:12

## 2023-10-13 RX ADMIN — SENNOSIDES 8.6 MG: 8.6 TABLET, FILM COATED ORAL at 22:35

## 2023-10-13 RX ADMIN — INSULIN LISPRO 1 UNITS: 100 INJECTION, SOLUTION INTRAVENOUS; SUBCUTANEOUS at 17:14

## 2023-10-13 RX ADMIN — GABAPENTIN 750 MG: 250 SOLUTION ORAL at 09:13

## 2023-10-13 RX ADMIN — FAMOTIDINE 20 MG: 20 TABLET, FILM COATED ORAL at 09:12

## 2023-10-13 RX ADMIN — BACLOFEN 10 MG: 10 TABLET ORAL at 22:12

## 2023-10-13 RX ADMIN — MEROPENEM 1 G: 1 INJECTION, POWDER, FOR SOLUTION INTRAVENOUS at 17:13

## 2023-10-13 RX ADMIN — ACETAMINOPHEN 650 MG: 325 TABLET ORAL at 06:17

## 2023-10-13 RX ADMIN — GABAPENTIN 750 MG: 250 SOLUTION ORAL at 17:14

## 2023-10-13 RX ADMIN — MUPIROCIN: 20 OINTMENT TOPICAL at 22:16

## 2023-10-13 RX ADMIN — MUPIROCIN: 20 OINTMENT TOPICAL at 09:13

## 2023-10-13 RX ADMIN — INSULIN LISPRO 1 UNITS: 100 INJECTION, SOLUTION INTRAVENOUS; SUBCUTANEOUS at 12:33

## 2023-10-13 RX ADMIN — BACLOFEN 10 MG: 10 TABLET ORAL at 09:11

## 2023-10-13 RX ADMIN — MORPHINE SULFATE 2 MG: 2 INJECTION, SOLUTION INTRAMUSCULAR; INTRAVENOUS at 09:11

## 2023-10-13 RX ADMIN — MORPHINE SULFATE 2 MG: 2 INJECTION, SOLUTION INTRAMUSCULAR; INTRAVENOUS at 14:08

## 2023-10-13 RX ADMIN — WARFARIN SODIUM 7.5 MG: 5 TABLET ORAL at 17:13

## 2023-10-13 RX ADMIN — DOCUSATE SODIUM 100 MG: 100 CAPSULE, LIQUID FILLED ORAL at 09:12

## 2023-10-13 RX ADMIN — LEVOTHYROXINE SODIUM 100 MCG: 50 TABLET ORAL at 06:17

## 2023-10-13 RX ADMIN — ARIPIPRAZOLE 2 MG: 2 TABLET ORAL at 09:13

## 2023-10-13 ASSESSMENT — PAIN SCALES - GENERAL
PAINLEVEL_OUTOF10: 9
PAINLEVEL_OUTOF10: 0 - NO PAIN
PAINLEVEL_OUTOF10: 8
PAINLEVEL_OUTOF10: 0 - NO PAIN
PAINLEVEL_OUTOF10: 7
PAINLEVEL_OUTOF10: 3
PAINLEVEL_OUTOF10: 8
PAINLEVEL_OUTOF10: 5 - MODERATE PAIN

## 2023-10-13 ASSESSMENT — COGNITIVE AND FUNCTIONAL STATUS - GENERAL
DAILY ACTIVITIY SCORE: 24
MOBILITY SCORE: 23
CLIMB 3 TO 5 STEPS WITH RAILING: A LITTLE

## 2023-10-13 ASSESSMENT — PAIN - FUNCTIONAL ASSESSMENT
PAIN_FUNCTIONAL_ASSESSMENT: 0-10

## 2023-10-13 ASSESSMENT — PAIN DESCRIPTION - DESCRIPTORS: DESCRIPTORS: BURNING;ACHING

## 2023-10-13 ASSESSMENT — ACTIVITIES OF DAILY LIVING (ADL): BATHING_ASSISTANCE: INDEPENDENT

## 2023-10-13 NOTE — PROGRESS NOTES
Physical Therapy    Physical Therapy Evaluation    Patient Name: Rut Tapia  MRN: [id number]  Today's Date: 10/13/2023   Time Calculation  Start Time: 1132  Stop Time: 1157  Time Calculation (min): 25 min    Assessment/Plan   PT Assessment  Evaluation/Treatment Tolerance: Patient tolerated treatment well  Assessment Comment:  (No further acute skilled PT needs, pt functioning at modified ind level)  End of Session Patient Position:  (seated edge of bed as prior to eval, call light in reach)  IP OR SWING BED PT PLAN  Inpatient or Swing Bed: Inpatient  PT Plan  PT Plan: PT Eval only  PT Eval Only Reason: At baseline function    Subjective     Current Problem:  1. Cat bite, initial encounter        2. Cellulitis of left lower extremity          Past Medical History:   Diagnosis Date    Arthritis     Chronic vascular disorders of intestine (CMS/HCC)     SMAS (superior mesenteric artery syndrome)    Narcolepsy without cataplexy     Narcolepsy    Personal history of other diseases of the nervous system and sense organs     History of cataract    Personal history of other infectious and parasitic diseases     History of staph infection    Personal history of other mental and behavioral disorders     History of dementia    Personal history of other venous thrombosis and embolism     History of deep venous thrombosis    Personal history of urinary calculi     History of kidney stones    Type 2 diabetes mellitus with diabetic neuropathy, unspecified (CMS/HCC)     Diabetes mellitus with diabetic neuropathy     Past Surgical History:   Procedure Laterality Date    APPENDECTOMY  2022    Appendectomy     SECTION, CLASSIC  2013     Section    CHOLECYSTECTOMY  2022    Cholecystectomy    GASTRIC BYPASS  2013    Gastric Surgery For Morbid Obesity Gastric Bypass    HYSTERECTOMY  2022    Hysterectomy    OOPHORECTOMY  2013    Oophorectomy    OTHER SURGICAL HISTORY  2022     "Hernia repair    OTHER SURGICAL HISTORY  10/14/2022    Dilation and curettage    OTHER SURGICAL HISTORY  10/14/2022    Laminectomy    OTHER SURGICAL HISTORY  10/14/2022    Spinal cord stimulation    OTHER SURGICAL HISTORY  10/14/2022    Liver biopsy    OTHER SURGICAL HISTORY  10/14/2022    Cardiac catheterization    OTHER SURGICAL HISTORY  2022    Mastectomy bilateral    OTHER SURGICAL HISTORY  2022     section    OTHER SURGICAL HISTORY  2022    Gastric bypass surgery    OTHER SURGICAL HISTORY  2021    Breast biopsy    OTHER SURGICAL HISTORY  2021    Lymphatic Surgery    OTHER SURGICAL HISTORY  2019    Hysterectomy    OTHER SURGICAL HISTORY  2019    Cholecystectomy    OTHER SURGICAL HISTORY  2019    Knee surgery       General Visit Information:  General  Reason for Referral:  (PT eval & treat, impaired mobility; dx: cat bite lle, cellulitis lle)  Referred By:  fracisco)  Past Medical History Relevant to Rehab: cc: increased redness & swelling (PMH: arthritis, narcolepsy, DVT, PE, DM ii, neuropathy, HTN, COPD, OA, fibromyalgia, B12 deficiency, TIA, depression, POTS, RSD, breast ca, ovarian ca)  Prior to Session Communication:  (per RN stable to participate in eval)  Patient Position Received:  (seated edge of bed as prior to eval, call light in reach)  General Comment:  (pleasant & cooperative, receptive to mobility)    Home Living:  Home Living  Lives With:  (Spouse, apartment with elevator;independent ADLs, transfers/mobility with \"up-walker\". Owns reachers, sock aide, bedside commode over toilet, stall shower with seat & grab bar. Shared IADLs with . Sleeps in recliner.)  Precautions:  Precautions  Precautions Comment:  (no activity restrictions)  Pain:  Pain Assessment  Pain Assessment:  (Pain all over, all the time, rated 8-9 level)    Cognition:  Cognition  Overall Cognitive Status:  (A & O x 3, tangental, mod cues to remain on task)  Functional " "Assessments:        Transfers  Transfer:  (Modified independent sit to stand.  Modified independent for functional mobility with \"Up-walker\". No LOB noted.)  Ambulation/Gait Training  Ambulation/Gait Training Performed:  (modified ind 100ft w/ use of upwalker, self maintained balance w/ changes in direction & maneuvering environmental obstacles)   Extremity/Trunk Assessments:     RLE   RLE :  (arom wfl , strength: hip flexion 3+/5, knee ext 4/5 ankle df 4/5)  LLE   LLE :  (arom grossly wfl, strength grossly 4/5)    Outcome Measures:  [facility] Basic Mobility  Turning from your back to your side while in a flat bed without using bedrails: None  Moving from lying on your back to sitting on the side of a flat bed without using bedrails: None  Moving to and from bed to chair (including a wheelchair): None  Standing up from a chair using your arms (e.g. wheelchair or bedside chair): None  To walk in hospital room: None  Climbing 3-5 steps with railing: A little  Basic Mobility - Total Score: 23    EDUCATION: Educated pt re: safety, activity progression, monitoring tolerance to functional activities for safety & tolerance     "

## 2023-10-13 NOTE — PROGRESS NOTES
"Occupational Therapy    Evaluation    Patient Name: Rut Tapia  MRN: [id number]  Today's Date: 10/13/2023  Time Calculation  Start Time: 1133  Stop Time: 1157  Time Calculation (min): 24 min        Assessment:   OT Assessment Results:  Patient presents at baseline functional status for ADLs, transfers and mobility.    Plan:  No Skilled OT: Independent with ADLs  OT Discharge Recommendations: No further acute OT       Subjective   Current Problem:  1. Cat bite, initial encounter        2. Cellulitis of left lower extremity          General:  General  Reason for Referral: OT eval & treat for ADLs (Dx: cellulitis LLE s/p cat bite)  Referred By: Edna LADNAVERDE-CNP  Past Medical History Relevant to Rehab: cc: increased redness & swelling   PMH: arthritis, narcolepsy, DVT, PE, DM ii, neuropathy, HTN, COPD, OA, fibromyalgia, B12 deficiency, TIA, depression, POTS, RSD, breast ca, ovarian ca  Prior to Session Communication: Bedside nurse (Per RN, patient is medically stable for therapy eval)  Patient Position Received: Alarm off, not on at start of session, sitting up edge of bed;  Following eval: patient sitting up edge of bed, call light in reach.      Precautions:  Precautions Comment: Activity: no restrictions     Pain:  Pain Assessment  Pain Assessment:  (Pain all over, all the time, rated 8-9 level)    Objective   Cognition:  Overall Cognitive Status:  (A & O x 3, tangental, mod cues to remain on task)         Home Living:  Lives With:  Spouse, apartment with elevator;independent ADLs, transfers/mobility with \"up-walker\". Owns reachers, sock aide, bedside commode over toilet, stall shower with seat & grab bar. Shared IADLs with . Sleeps in recliner.    ADL:  Eating Assistance: Independent  Grooming Assistance: Independent  Bathing Assistance: Independent  UE Dressing Assistance: Independent  LE Dressing Assistance: Independent  Toileting Assistance with Device: Independent    Activity " "Tolerance:  Endurance: Endurance does not limit participation in activity    Bed Mobility/Transfers:     Transfer:  Modified independent sit to stand.  Modified independent for functional mobility with \"Up-walker\". No LOB noted.     Extremities:   RUE : Within Functional Limits   LUE: Within Functional Limits        Outcome Measures:[facility] Daily Activity  Putting on and taking off regular lower body clothing: None  Bathing (including washing, rinsing, drying): None  Putting on and taking off regular upper body clothing: None  Toileting, which includes using toilet, bedpan or urinal: None  Taking care of personal grooming such as brushing teeth: None  Eating Meals: None  Daily Activity - Total Score: 24      EDUCATION:  Education  Individual(s) Educated: Patient  Education Provided:  Compensatory techs and adaptive equipment for ADLs/IADLs                   "

## 2023-10-13 NOTE — PROGRESS NOTES
Subjective:  The patient reports that the left lower extremity pain has improved, but still is significant.    Objective:    Temp Readings from Last 1 Encounters:   10/13/23 36.7 °C (98.1 °F)    , Visit Vitals  /60   Pulse 62   Temp 36.7 °C (98.1 °F)   Resp 19        Lab Results   Component Value Date    WBC 5.6 10/12/2023    HGB 10.5 (L) 10/12/2023    HCT 34.9 (L) 10/12/2023    MCV 91 10/12/2023     10/12/2023       Lab Results   Component Value Date    GLUCOSE 222 (H) 10/12/2023    CALCIUM 8.4 (L) 10/12/2023     10/12/2023    K 4.0 10/12/2023    CO2 26 10/12/2023     10/12/2023    BUN 10 10/12/2023    CREATININE 0.74 10/12/2023       Physical exam:    The left lower extremity wounds have minimal bloody drainage on the dressing.  There is surrounding erythema localized to the openings.  There is no underlying fluctuance, but significant tenderness to palpation is still present.    Assessment:  The left lower extremity infection continues to improve, however very slowly.  Dr. Garsia did do cultures of the left leg wound yesterday, but no results are in the computer.  -Plan:  -Continue with local wound care and antibiotics per the infectious disease service.

## 2023-10-13 NOTE — CONSULTS
Vancomycin Dosing by Pharmacy- Cessation of Therapy    Consult to pharmacy for vancomycin dosing has been discontinued by the prescriber, pharmacy will sign off at this time.    Please call pharmacy if there are further questions or re-enter a consult if vancomycin is resumed.     Amaris Boyer, PharmD

## 2023-10-13 NOTE — PROGRESS NOTES
"Rut Tapia is a 57 y.o. female on day 1 of admission presenting with Cat bite of left lower leg, initial encounter.    Subjective   Ct ordered for kidney pain.        Objective     Physical Exam  Constitutional:       Appearance: She is obese.   HENT:      Nose: Nose normal.      Mouth/Throat:      Mouth: Mucous membranes are moist.      Pharynx: Oropharynx is clear.   Eyes:      Pupils: Pupils are equal, round, and reactive to light.   Cardiovascular:      Rate and Rhythm: Normal rate and regular rhythm.   Pulmonary:      Effort: Pulmonary effort is normal.   Abdominal:      General: Bowel sounds are normal.   Musculoskeletal:      Cervical back: Normal range of motion.      Right lower leg: Edema present.      Comments: Erythema to anterior left shin   Skin:     General: Skin is warm.      Capillary Refill: Capillary refill takes less than 2 seconds.   Neurological:      Mental Status: She is alert.   Psychiatric:         Mood and Affect: Mood normal.     Last Recorded Vitals  Blood pressure 119/60, pulse 62, temperature 36.7 °C (98.1 °F), resp. rate 19, height 1.6 m (5' 3\"), weight 118 kg (259 lb 14.8 oz), SpO2 96 %.  Intake/Output last 3 Shifts:  No intake/output data recorded.    Relevant Results                             Assessment/Plan   Principal Problem:    Cat bite of left lower leg, initial encounter  Active Problems:    Cat bite, initial encounter    Rut is a 57-year-old female patient is alert and oriented x3 presenting to emergency department for evaluation of a cat bite to the left lower extremity that occurred on 10/2/2023.  Patient states that it has become more red and swollen and she has a history of MRSA which prompted her to come to emergency department for evaluation.  Blood cultures pending, IV antibiotics started and admission for further medical management.     Cat bite/cellulitis left lower extremity  Admit to Dr. Nicolas Quintero  See imaging results above  Blood cultures " pending  Continue IV vancomycin/cefepime/Flagyl  Tylenol as needed  Sed rate/CRP pending  Ortho consulted given tenderness -- MRI tib/fib negative for abscess  Continue IV antibiotics  Will obtain wound culture now that area open  Consult ID, anatoly mckay     2.  Right flank pain  Check CT a/p w/o to r/o nephrolithiasis    3.  Diabetes  Diabetic diet  ISS mild corrective  Hypoglycemia protocol  Hold oral antidiabetic medications     4.  HTN/COPD/osteoarthritis/fibromyalgia/B12 deficiency/TIA/PEs/DVT/depression/POTS  Diabetic diet  Continue home medications as indicated      5.  DVT Ppx  SCDs  Lovenox subcutaneous  Activity as tolerated    Dispo: Continue IV antibiotics, await ID input       I spent 35 minutes in the professional and overall care of this patient.      Mookie Meyer, DO

## 2023-10-14 VITALS
SYSTOLIC BLOOD PRESSURE: 143 MMHG | TEMPERATURE: 97.7 F | DIASTOLIC BLOOD PRESSURE: 77 MMHG | HEIGHT: 63 IN | RESPIRATION RATE: 18 BRPM | OXYGEN SATURATION: 95 % | BODY MASS INDEX: 46.05 KG/M2 | HEART RATE: 71 BPM | WEIGHT: 259.92 LBS
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
## 2023-10-14 PROBLEM — S81.852A: Status: RESOLVED | Noted: 2023-10-07 | Resolved: 2023-10-14
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
## 2023-10-14 LAB
ANION GAP SERPL CALC-SCNC: 9 MMOL/L (ref 10–20)
BUN SERPL-MCNC: 12 MG/DL (ref 6–23)
CALCIUM SERPL-MCNC: 8.5 MG/DL (ref 8.6–10.3)
CHLORIDE SERPL-SCNC: 108 MMOL/L (ref 98–107)
CO2 SERPL-SCNC: 27 MMOL/L (ref 21–32)
CREAT SERPL-MCNC: 0.64 MG/DL (ref 0.5–1.05)
ERYTHROCYTE [DISTWIDTH] IN BLOOD BY AUTOMATED COUNT: 14.8 % (ref 11.5–14.5)
GFR SERPL CREATININE-BSD FRML MDRD: >90 ML/MIN/1.73M*2
GLUCOSE BLD MANUAL STRIP-MCNC: 142 MG/DL (ref 74–99)
GLUCOSE BLD MANUAL STRIP-MCNC: 157 MG/DL (ref 74–99)
GLUCOSE BLD MANUAL STRIP-MCNC: 168 MG/DL (ref 74–99)
GLUCOSE SERPL-MCNC: 155 MG/DL (ref 74–99)
HCT VFR BLD AUTO: 34.8 % (ref 36–46)
HGB BLD-MCNC: 10.6 G/DL (ref 12–16)
HOLD SPECIMEN: NORMAL
MCH RBC QN AUTO: 27 PG (ref 26–34)
MCHC RBC AUTO-ENTMCNC: 30.5 G/DL (ref 32–36)
MCV RBC AUTO: 89 FL (ref 80–100)
NRBC BLD-RTO: 0 /100 WBCS (ref 0–0)
PLATELET # BLD AUTO: 205 X10*3/UL (ref 150–450)
PMV BLD AUTO: 9.7 FL (ref 7.5–11.5)
POTASSIUM SERPL-SCNC: 4.2 MMOL/L (ref 3.5–5.3)
RBC # BLD AUTO: 3.92 X10*6/UL (ref 4–5.2)
SODIUM SERPL-SCNC: 140 MMOL/L (ref 136–145)
WBC # BLD AUTO: 5.4 X10*3/UL (ref 4.4–11.3)

## 2023-10-14 PROCEDURE — 2500000004 HC RX 250 GENERAL PHARMACY W/ HCPCS (ALT 636 FOR OP/ED): Performed by: INTERNAL MEDICINE

## 2023-10-14 PROCEDURE — 1100000001 HC PRIVATE ROOM DAILY

## 2023-10-14 PROCEDURE — 2500000004 HC RX 250 GENERAL PHARMACY W/ HCPCS (ALT 636 FOR OP/ED)

## 2023-10-14 PROCEDURE — 99238 HOSP IP/OBS DSCHRG MGMT 30/<: CPT | Performed by: STUDENT IN AN ORGANIZED HEALTH CARE EDUCATION/TRAINING PROGRAM

## 2023-10-14 PROCEDURE — 96372 THER/PROPH/DIAG INJ SC/IM: CPT | Performed by: INTERNAL MEDICINE

## 2023-10-14 PROCEDURE — 2500000001 HC RX 250 WO HCPCS SELF ADMINISTERED DRUGS (ALT 637 FOR MEDICARE OP): Performed by: INTERNAL MEDICINE

## 2023-10-14 PROCEDURE — 85027 COMPLETE CBC AUTOMATED: CPT

## 2023-10-14 PROCEDURE — 2500000002 HC RX 250 W HCPCS SELF ADMINISTERED DRUGS (ALT 637 FOR MEDICARE OP, ALT 636 FOR OP/ED): Performed by: INTERNAL MEDICINE

## 2023-10-14 PROCEDURE — 82947 ASSAY GLUCOSE BLOOD QUANT: CPT

## 2023-10-14 PROCEDURE — 36415 COLL VENOUS BLD VENIPUNCTURE: CPT

## 2023-10-14 PROCEDURE — 82374 ASSAY BLOOD CARBON DIOXIDE: CPT

## 2023-10-14 RX ORDER — CEFUROXIME AXETIL 500 MG/1
500 TABLET ORAL 2 TIMES DAILY
Qty: 14 TABLET | Refills: 0 | Status: SHIPPED | OUTPATIENT
Start: 2023-10-14 | End: 2023-10-21

## 2023-10-14 RX ORDER — METRONIDAZOLE 500 MG/1
500 TABLET ORAL 3 TIMES DAILY
Qty: 21 TABLET | Refills: 0 | Status: SHIPPED | OUTPATIENT
Start: 2023-10-14 | End: 2023-10-21

## 2023-10-14 RX ORDER — ONDANSETRON HYDROCHLORIDE 2 MG/ML
4 INJECTION, SOLUTION INTRAVENOUS ONCE AS NEEDED
Status: DISCONTINUED | OUTPATIENT
Start: 2023-10-14 | End: 2023-10-15 | Stop reason: HOSPADM

## 2023-10-14 RX ORDER — MUPIROCIN 20 MG/G
OINTMENT TOPICAL 3 TIMES DAILY
Qty: 30 G | Refills: 0 | Status: SHIPPED | OUTPATIENT
Start: 2023-10-14

## 2023-10-14 RX ADMIN — DOCUSATE SODIUM 100 MG: 100 CAPSULE, LIQUID FILLED ORAL at 08:40

## 2023-10-14 RX ADMIN — INSULIN LISPRO 1 UNITS: 100 INJECTION, SOLUTION INTRAVENOUS; SUBCUTANEOUS at 08:45

## 2023-10-14 RX ADMIN — GABAPENTIN 750 MG: 250 SOLUTION ORAL at 01:19

## 2023-10-14 RX ADMIN — LEVOTHYROXINE SODIUM 100 MCG: 50 TABLET ORAL at 06:23

## 2023-10-14 RX ADMIN — MORPHINE SULFATE 2 MG: 2 INJECTION, SOLUTION INTRAMUSCULAR; INTRAVENOUS at 04:13

## 2023-10-14 RX ADMIN — BACLOFEN 10 MG: 10 TABLET ORAL at 16:02

## 2023-10-14 RX ADMIN — QUETIAPINE FUMARATE 200 MG: 100 TABLET ORAL at 19:32

## 2023-10-14 RX ADMIN — MORPHINE SULFATE 2 MG: 2 INJECTION, SOLUTION INTRAMUSCULAR; INTRAVENOUS at 11:23

## 2023-10-14 RX ADMIN — MORPHINE SULFATE 2 MG: 2 INJECTION, SOLUTION INTRAMUSCULAR; INTRAVENOUS at 18:30

## 2023-10-14 RX ADMIN — MEROPENEM 1 G: 1 INJECTION, POWDER, FOR SOLUTION INTRAVENOUS at 17:53

## 2023-10-14 RX ADMIN — GABAPENTIN 750 MG: 250 SOLUTION ORAL at 16:02

## 2023-10-14 RX ADMIN — DOCUSATE SODIUM 100 MG: 100 CAPSULE, LIQUID FILLED ORAL at 19:33

## 2023-10-14 RX ADMIN — MEROPENEM 1 G: 1 INJECTION, POWDER, FOR SOLUTION INTRAVENOUS at 10:22

## 2023-10-14 RX ADMIN — BACLOFEN 10 MG: 10 TABLET ORAL at 08:41

## 2023-10-14 RX ADMIN — MUPIROCIN: 20 OINTMENT TOPICAL at 08:48

## 2023-10-14 RX ADMIN — FAMOTIDINE 20 MG: 20 TABLET, FILM COATED ORAL at 08:38

## 2023-10-14 RX ADMIN — PROMETHAZINE HYDROCHLORIDE 25 MG: 25 TABLET ORAL at 08:49

## 2023-10-14 RX ADMIN — INSULIN LISPRO 1 UNITS: 100 INJECTION, SOLUTION INTRAVENOUS; SUBCUTANEOUS at 12:43

## 2023-10-14 RX ADMIN — Medication 500 MG: at 08:40

## 2023-10-14 RX ADMIN — CALCIUM CARBONATE (ANTACID) CHEW TAB 500 MG 500 MG: 500 CHEW TAB at 08:40

## 2023-10-14 RX ADMIN — BACLOFEN 10 MG: 10 TABLET ORAL at 19:31

## 2023-10-14 RX ADMIN — MEROPENEM 1 G: 1 INJECTION, POWDER, FOR SOLUTION INTRAVENOUS at 01:26

## 2023-10-14 RX ADMIN — MUPIROCIN: 20 OINTMENT TOPICAL at 14:00

## 2023-10-14 RX ADMIN — PROMETHAZINE HYDROCHLORIDE 25 MG: 25 TABLET ORAL at 01:18

## 2023-10-14 RX ADMIN — SODIUM CHLORIDE 75 ML/HR: 900 INJECTION INTRAVENOUS at 10:19

## 2023-10-14 RX ADMIN — GABAPENTIN 750 MG: 250 SOLUTION ORAL at 08:37

## 2023-10-14 RX ADMIN — SODIUM CHLORIDE 75 ML/HR: 900 INJECTION INTRAVENOUS at 04:13

## 2023-10-14 RX ADMIN — ARIPIPRAZOLE 2 MG: 2 TABLET ORAL at 08:41

## 2023-10-14 RX ADMIN — Medication 1 CAPSULE: at 08:39

## 2023-10-14 RX ADMIN — ACETAMINOPHEN 650 MG: 325 TABLET ORAL at 08:36

## 2023-10-14 RX ADMIN — THIAMINE HCL TAB 100 MG 500 MG: 100 TAB at 08:39

## 2023-10-14 RX ADMIN — WARFARIN SODIUM 7.5 MG: 5 TABLET ORAL at 17:53

## 2023-10-14 RX ADMIN — FAMOTIDINE 20 MG: 20 TABLET, FILM COATED ORAL at 19:32

## 2023-10-14 ASSESSMENT — PAIN - FUNCTIONAL ASSESSMENT
PAIN_FUNCTIONAL_ASSESSMENT: 0-10

## 2023-10-14 ASSESSMENT — PAIN SCALES - GENERAL
PAINLEVEL_OUTOF10: 8
PAINLEVEL_OUTOF10: 9
PAINLEVEL_OUTOF10: 7
PAINLEVEL_OUTOF10: 7
PAINLEVEL_OUTOF10: 9
PAINLEVEL_OUTOF10: 6
PAINLEVEL_OUTOF10: 5 - MODERATE PAIN

## 2023-10-14 ASSESSMENT — PAIN DESCRIPTION - DESCRIPTORS
DESCRIPTORS: ACHING
DESCRIPTORS: SHARP

## 2023-10-14 NOTE — PROGRESS NOTES
This  [facility] for PT OT RN. DC home today. Message with questions.   GERALD Colon

## 2023-10-14 NOTE — DISCHARGE SUMMARY
Discharge Diagnosis  Cat bite of left lower leg, initial encounter    Issues Requiring Follow-Up  cellulitis    Test Results Pending At Discharge  Pending Labs       Order Current Status    Tissue/Wound Culture/Smear Collected (10/12/23 1245)    Extra Tubes In process    Lavender Top In process    SST TOP In process    Tissue/Wound Culture/Smear Preliminary result            Hospital Course   Rut is a 57-year-old female patient is alert and oriented x3 presenting to emergency department for evaluation of a cat bite to the left lower extremity that occurred on 10/2/2023.  Patient states that it has become more red and swollen and she has a history of MRSA which prompted her to come to emergency department for evaluation.  Blood cultures pending, IV antibiotics started and admission for further medical management.     Cat bite/cellulitis left lower extremity  Admit to Dr. Nicolas Quintero  See imaging results above  Blood cultures pending  Continue IV vancomycin/cefepime/Flagyl  Tylenol as needed  Sed rate/CRP pending  Ortho consulted given tenderness -- MRI tib/fib negative for abscess  Continue IV antibiotics  Will obtain wound culture now that area open  Consult ID, appreciate recs     2.  Right flank pain  Check CT a/p w/o to r/o nephrolithiasis     3.  Diabetes  Diabetic diet  ISS mild corrective  Hypoglycemia protocol  Hold oral antidiabetic medications     4.  HTN/COPD/osteoarthritis/fibromyalgia/B12 deficiency/TIA/PEs/DVT/depression/POTS  Diabetic diet  Continue home medications as indicated      5.  DVT Ppx  SCDs  Lovenox subcutaneous  Activity as tolerated     Dispo: Continue IV antibiotics, await ID cleared    Pertinent Physical Exam At Time of Discharge  Physical Exam  Constitutional:       Appearance: Normal appearance.   HENT:      Head: Normocephalic and atraumatic.      Right Ear: Tympanic membrane and ear canal normal.      Left Ear: Tympanic membrane and ear canal normal.      Mouth/Throat:       Mouth: Mucous membranes are moist.      Pharynx: Oropharynx is clear.   Eyes:      Extraocular Movements: Extraocular movements intact.      Conjunctiva/sclera: Conjunctivae normal.      Pupils: Pupils are equal, round, and reactive to light.   Cardiovascular:      Rate and Rhythm: Normal rate and regular rhythm.      Pulses: Normal pulses.      Heart sounds: Normal heart sounds.   Pulmonary:      Effort: Pulmonary effort is normal.      Breath sounds: Normal breath sounds.   Abdominal:      General: Abdomen is flat. Bowel sounds are normal.      Palpations: Abdomen is soft.   Musculoskeletal:         General: Normal range of motion.      Cervical back: Normal range of motion and neck supple.   Skin:     General: Skin is warm and dry.      Capillary Refill: Capillary refill takes 2 to 3 seconds.   Neurological:      General: No focal deficit present.      Mental Status: She is alert and oriented to person, place, and time. Mental status is at baseline.   Psychiatric:         Mood and Affect: Mood normal.         Behavior: Behavior normal.         Thought Content: Thought content normal.         Judgment: Judgment normal.         Home Medications     Medication List      START taking these medications     cefuroxime 500 mg tablet; Commonly known as: Ceftin; Take 1 tablet (500   mg) by mouth 2 times a day for 7 days.   metroNIDAZOLE 500 mg tablet; Commonly known as: Flagyl; Take 1 tablet   (500 mg) by mouth 3 times a day for 7 days.   mupirocin 2 % ointment; Commonly known as: Bactroban; Apply topically 3   times a day.     CONTINUE taking these medications     acetaminophen 500 mg/15 mL liquid   albuterol 90 mcg/actuation inhaler   ARIPiprazole 2 mg tablet; Commonly known as: Abilify   B complex-vitamin C-folic acid 1 mg capsule; Commonly known as:   Nephrocaps   baclofen 10 mg tablet; Commonly known as: Lioresal   CALTRATE 600 PLUS D ORAL   * cyanocobalamin (vitamin B-12) 1,000 mcg/mL kit   * VITAMIN B-12 ORAL    docusate sodium 100 mg capsule; Commonly known as: Colace   DROPLET PEN NEEDLE MISC   * ergocalciferol 200 mcg/mL (8,000 unit/mL) drops; Commonly known as:   Vitamin D-2   * VITAMIN D2 ORAL   famotidine 40 mg tablet; Commonly known as: Pepcid   ferrous gluconate 256 mg (28 mg iron) tablet   FreeStyle glucose monitoring kit   gabapentin 250 mg/5 mL (5 mL) solution   insulin glargine 100 unit/mL (3 mL) pen; Commonly known as: Lantus   insulin lispro 100 unit/mL injection; Commonly known as: HumaLOG   levothyroxine 100 mcg tablet; Commonly known as: Synthroid, Levoxyl   methadone 5 mg/5 mL solution; Commonly known as: Dolophine   naloxone 4 mg/0.1 mL nasal spray; Commonly known as: Narcan   nitroglycerin 0.4 mg SL tablet; Commonly known as: Nitrostat   NON FORMULARY   NON FORMULARY   NON FORMULARY   olmesartan 20 mg tablet; Commonly known as: BENIcar   PROBIOTIC PLUS AND CRANBERRY ORAL   promethazine 25 mg tablet; Commonly known as: Phenergan   QUEtiapine 200 mg tablet; Commonly known as: SEROquel   rosuvastatin 5 mg tablet; Commonly known as: Crestor   sennosides 8.6 mg tablet; Commonly known as: Senokot   SUMAtriptan 100 mg tablet; Commonly known as: Imitrex   thiamine 50 mg tablet; Commonly known as: Vitamin B-1   topiramate 50 mg tablet; Commonly known as: Topamax   torsemide 20 mg tablet; Commonly known as: Demadex   valACYclovir 500 mg tablet; Commonly known as: Valtrex   Voltaren 1 % gel gel; Generic drug: diclofenac sodium   warfarin 10 mg tablet; Commonly known as: Coumadin   ZINC ORAL  * This list has 4 medication(s) that are the same as other medications   prescribed for you. Read the directions carefully, and ask your doctor or   other care provider to review them with you.       Outpatient Follow-Up  No future appointments.    Mookie Meyer, DO

## 2023-10-15 LAB
BACTERIA SPEC CULT: NORMAL
GRAM STN SPEC: NORMAL
GRAM STN SPEC: NORMAL

## 2023-10-17 DIAGNOSIS — L03.90 CELLULITIS, UNSPECIFIED CELLULITIS SITE: Primary | ICD-10-CM

## 2023-10-17 RX ORDER — DOXYCYCLINE 100 MG/1
100 CAPSULE ORAL 2 TIMES DAILY
Qty: 14 CAPSULE | Refills: 0 | Status: SHIPPED | OUTPATIENT
Start: 2023-10-17 | End: 2023-10-24

## 2023-10-17 RX ORDER — DOXYCYCLINE 100 MG/1
100 CAPSULE ORAL 2 TIMES DAILY
Qty: 14 CAPSULE | Refills: 0 | Status: SHIPPED | OUTPATIENT
Start: 2023-10-17 | End: 2023-10-17 | Stop reason: SDUPTHER

## 2023-10-17 NOTE — PROGRESS NOTES
Subjective   Reason for Visit: Rut Tapia is an 57 y.o. female here for a Medicare Wellness visit.               HPI    Patient Care Team:  Lisandra Lennon MD as PCP - General     Review of Systems    Objective   Vitals:  There were no vitals taken for this visit.      Physical Exam    Assessment/Plan   Problem List Items Addressed This Visit    None

## 2023-10-23 ENCOUNTER — HOSPITAL ENCOUNTER (OUTPATIENT)
Dept: RADIOLOGY | Facility: HOSPITAL | Age: 58
Discharge: HOME | End: 2023-10-23
Payer: MEDICARE

## 2023-10-23 DIAGNOSIS — M54.9 BACK PAIN: ICD-10-CM

## 2023-10-23 PROCEDURE — 76000 FLUOROSCOPY <1 HR PHYS/QHP: CPT

## 2023-10-24 ENCOUNTER — TELEPHONE (OUTPATIENT)
Dept: PAIN MEDICINE | Facility: CLINIC | Age: 58
End: 2023-10-24
Payer: MEDICARE

## 2023-10-24 DIAGNOSIS — M47.814 THORACIC SPONDYLOSIS WITHOUT MYELOPATHY: Primary | ICD-10-CM

## 2023-10-24 NOTE — TELEPHONE ENCOUNTER
The pt had 85% relief for her thoracic facet block and needs to have her 2nd facet block scheduled.

## 2023-10-26 ENCOUNTER — TELEPHONE (OUTPATIENT)
Dept: PAIN MEDICINE | Facility: CLINIC | Age: 58
End: 2023-10-26
Payer: MEDICARE

## 2023-11-07 ENCOUNTER — LAB REQUISITION (OUTPATIENT)
Dept: LAB | Facility: HOSPITAL | Age: 58
End: 2023-11-07
Payer: MEDICARE

## 2023-11-07 DIAGNOSIS — Z79.01 LONG TERM (CURRENT) USE OF ANTICOAGULANTS: ICD-10-CM

## 2023-11-07 LAB
HOLD SPECIMEN: NORMAL
INR PPP: 1.9 (ref 0.9–1.1)
PROTHROMBIN TIME: 21.6 SECONDS (ref 9.8–12.8)

## 2023-11-07 PROCEDURE — 85610 PROTHROMBIN TIME: CPT | Mod: OUT | Performed by: FAMILY MEDICINE

## 2023-11-13 ENCOUNTER — LAB REQUISITION (OUTPATIENT)
Dept: LAB | Facility: HOSPITAL | Age: 58
End: 2023-11-13
Payer: MEDICARE

## 2023-11-13 DIAGNOSIS — Z79.01 LONG TERM (CURRENT) USE OF ANTICOAGULANTS: ICD-10-CM

## 2023-11-13 DIAGNOSIS — I10 ESSENTIAL (PRIMARY) HYPERTENSION: ICD-10-CM

## 2023-11-13 LAB
INR PPP: 2.8 (ref 0.9–1.1)
PROTHROMBIN TIME: 32.4 SECONDS (ref 9.8–12.8)

## 2023-11-13 PROCEDURE — 85610 PROTHROMBIN TIME: CPT | Mod: OUT | Performed by: FAMILY MEDICINE

## 2023-11-15 ENCOUNTER — LAB REQUISITION (OUTPATIENT)
Dept: LAB | Facility: HOSPITAL | Age: 58
End: 2023-11-15
Payer: MEDICARE

## 2023-11-15 DIAGNOSIS — I10 ESSENTIAL (PRIMARY) HYPERTENSION: ICD-10-CM

## 2023-11-15 DIAGNOSIS — I48.91 UNSPECIFIED ATRIAL FIBRILLATION (MULTI): ICD-10-CM

## 2023-11-15 DIAGNOSIS — Z79.01 LONG TERM (CURRENT) USE OF ANTICOAGULANTS: ICD-10-CM

## 2023-11-15 DIAGNOSIS — G90.529 COMPLEX REGIONAL PAIN SYNDROME I OF UNSPECIFIED LOWER LIMB: ICD-10-CM

## 2023-11-15 LAB
HOLD SPECIMEN: NORMAL
HOLD SPECIMEN: NORMAL

## 2023-11-15 PROCEDURE — 83090 ASSAY OF HOMOCYSTEINE: CPT | Mod: OUT,PARLAB | Performed by: FAMILY MEDICINE

## 2023-11-16 LAB — HCYS SERPL-SCNC: 28.35 UMOL/L (ref 5–13.9)

## 2023-11-20 ENCOUNTER — LAB REQUISITION (OUTPATIENT)
Dept: LAB | Facility: HOSPITAL | Age: 58
End: 2023-11-20
Payer: MEDICARE

## 2023-11-20 DIAGNOSIS — I48.91 UNSPECIFIED ATRIAL FIBRILLATION (MULTI): ICD-10-CM

## 2023-11-20 LAB
INR PPP: 1.1 (ref 0.9–1.1)
PROTHROMBIN TIME: 12 SECONDS (ref 9.8–12.8)

## 2023-11-20 PROCEDURE — 85610 PROTHROMBIN TIME: CPT | Mod: OUT | Performed by: STUDENT IN AN ORGANIZED HEALTH CARE EDUCATION/TRAINING PROGRAM

## 2023-11-21 ENCOUNTER — HOSPITAL ENCOUNTER (OUTPATIENT)
Dept: RADIOLOGY | Facility: HOSPITAL | Age: 58
Discharge: HOME | End: 2023-11-21
Payer: MEDICARE

## 2023-11-21 ENCOUNTER — HOSPITAL ENCOUNTER (OUTPATIENT)
Dept: PAIN MEDICINE | Facility: CLINIC | Age: 58
Discharge: HOME | End: 2023-11-21
Payer: MEDICARE

## 2023-11-21 VITALS
RESPIRATION RATE: 20 BRPM | DIASTOLIC BLOOD PRESSURE: 74 MMHG | SYSTOLIC BLOOD PRESSURE: 155 MMHG | HEART RATE: 88 BPM | OXYGEN SATURATION: 97 % | TEMPERATURE: 97.2 F

## 2023-11-21 DIAGNOSIS — M47.814 THORACIC SPONDYLOSIS WITHOUT MYELOPATHY: ICD-10-CM

## 2023-11-21 DIAGNOSIS — G90.50 RSD (REFLEX SYMPATHETIC DYSTROPHY): Primary | ICD-10-CM

## 2023-11-21 PROCEDURE — 94760 N-INVAS EAR/PLS OXIMETRY 1: CPT

## 2023-11-21 PROCEDURE — 7100000009 HC PHASE TWO TIME - INITIAL BASE CHARGE: Performed by: PAIN MEDICINE

## 2023-11-21 PROCEDURE — 64491 INJ PARAVERT F JNT C/T 2 LEV: CPT | Mod: 50 | Performed by: PAIN MEDICINE

## 2023-11-21 PROCEDURE — 7100000010 HC PHASE TWO TIME - EACH INCREMENTAL 1 MINUTE: Performed by: PAIN MEDICINE

## 2023-11-21 PROCEDURE — 76000 FLUOROSCOPY <1 HR PHYS/QHP: CPT

## 2023-11-21 PROCEDURE — 2500000005 HC RX 250 GENERAL PHARMACY W/O HCPCS: Performed by: PAIN MEDICINE

## 2023-11-21 PROCEDURE — 64491 INJ PARAVERT F JNT C/T 2 LEV: CPT | Performed by: PAIN MEDICINE

## 2023-11-21 PROCEDURE — 2500000004 HC RX 250 GENERAL PHARMACY W/ HCPCS (ALT 636 FOR OP/ED): Performed by: PAIN MEDICINE

## 2023-11-21 PROCEDURE — 64492 INJ PARAVERT F JNT C/T 3 LEV: CPT | Mod: 50 | Performed by: PAIN MEDICINE

## 2023-11-21 PROCEDURE — 64490 INJ PARAVERT F JNT C/T 1 LEV: CPT | Performed by: PAIN MEDICINE

## 2023-11-21 PROCEDURE — 64490 INJ PARAVERT F JNT C/T 1 LEV: CPT | Mod: 50

## 2023-11-21 RX ORDER — LIDOCAINE IN NACL,ISO-OSMOT/PF 30 MG/3 ML
10 SYRINGE (ML) INJECTION ONCE
Status: COMPLETED | OUTPATIENT
Start: 2023-11-21 | End: 2023-11-21

## 2023-11-21 RX ORDER — BACLOFEN 10 MG/1
10 TABLET ORAL 3 TIMES DAILY
Qty: 90 TABLET | Refills: 11 | Status: SHIPPED | OUTPATIENT
Start: 2023-11-21 | End: 2024-04-03 | Stop reason: SDUPTHER

## 2023-11-21 RX ORDER — BUPIVACAINE HYDROCHLORIDE 5 MG/ML
10 INJECTION, SOLUTION PERINEURAL ONCE
Status: COMPLETED | OUTPATIENT
Start: 2023-11-21 | End: 2023-11-21

## 2023-11-21 RX ADMIN — BUPIVACAINE HYDROCHLORIDE 50 MG: 5 INJECTION, SOLUTION PERINEURAL at 14:10

## 2023-11-21 RX ADMIN — Medication 100 MG: at 14:11

## 2023-11-21 ASSESSMENT — PAIN SCALES - GENERAL
PAINLEVEL_OUTOF10: 8
PAINLEVEL_OUTOF10: 4

## 2023-11-21 ASSESSMENT — PAIN DESCRIPTION - DESCRIPTORS
DESCRIPTORS: ACHING
DESCRIPTORS: ACHING

## 2023-11-21 ASSESSMENT — PAIN - FUNCTIONAL ASSESSMENT: PAIN_FUNCTIONAL_ASSESSMENT: 0-10

## 2023-11-21 NOTE — OP NOTE
Clinical Note  The patient is here today to receive diagnostic medial nerve branch block to assist with pain bilateral T10-T11 T11-T12    Procedure Note  The patient was taken to the procedure room, was placed into a prone position. The area was prepped and draped sterilely in the normal fashion. The patient was throughout the procedure monitored by the nursing staff. The skin and subcutaneous tissue was anesthetized with 1% lidocaine. Then 22-gauge spinal needles were introduced into the approximation of the medial nerve branches at the above mentioned level, confirmed in AP and oblique view with negative aspiration of heme and CSF. The patient received 0.5 mL of 0.5% Marcaine per site. The patient tolerated the procedure well, was taken to the recovery room, allowed to recover for sufficient amount of time and then was discharged home in stable condition. The patient was advised to followup with the [facility] to reassess the improvement and determine the need for the radiofrequency ablation.    Thank you for allowing me to participate in the care of this patient.

## 2023-11-21 NOTE — DISCHARGE INSTRUCTIONS
Post-injection instructions FOR FACET BLOCK      Pay attention to how much pain relief (what percentage compared to before the procedure) you get and for how long it lasts.     THIS IS A TEMPORARY NUMBING OF PAIN THIS IS BEING USED AS A DIAGNOSTIC INDICATOR IF THIS IS THE SOURCE OF YOUR PAIN    Activity:  RETURN TO NORMAL ACTIVITY  SEE IF YOU CAN APPRECIATE AN IMPROVEMENT IN THE PAIN    Bandages: Remove after 24 hours     Showering/Bathing: You may shower after bandage is removed     Follow up: CALL OFFICE NEXT BUSINESS [phone] LEAVE MESSAGE ABOUT THE % OF RELIEF THAT WAS OBTAINED AND FOR HOW MANY HOURS      Call the OFFICE immediately: if you notice:     Excessive bleeding from procedure site (brisk bright red bleeding from the site or bleeding that soaks the bandages or does not stop)   Severe headache  Inability to walk, leg or arm weakness or numbness that is worse after the procedure   Uncontrolled pain   New urinary or fecal incontinence   Signs of infection: Fever above 101.5F, redness, swelling, pus or drainage from the site

## 2023-11-22 ENCOUNTER — TELEPHONE (OUTPATIENT)
Dept: PAIN MEDICINE | Facility: CLINIC | Age: 58
End: 2023-11-22
Payer: MEDICARE

## 2023-11-22 DIAGNOSIS — M47.814 SPONDYLOSIS, THORACIC: Primary | ICD-10-CM

## 2023-12-04 ENCOUNTER — OFFICE VISIT (OUTPATIENT)
Dept: PAIN MEDICINE | Facility: CLINIC | Age: 58
End: 2023-12-04
Payer: MEDICARE

## 2023-12-04 DIAGNOSIS — G90.50 RSD (REFLEX SYMPATHETIC DYSTROPHY): Primary | ICD-10-CM

## 2023-12-04 PROCEDURE — 99214 OFFICE O/P EST MOD 30 MIN: CPT | Performed by: PAIN MEDICINE

## 2023-12-04 PROCEDURE — 1036F TOBACCO NON-USER: CPT | Performed by: PAIN MEDICINE

## 2023-12-04 PROCEDURE — 3066F NEPHROPATHY DOC TX: CPT | Performed by: PAIN MEDICINE

## 2023-12-04 PROCEDURE — 4010F ACE/ARB THERAPY RXD/TAKEN: CPT | Performed by: PAIN MEDICINE

## 2023-12-04 RX ORDER — METHADONE HYDROCHLORIDE 5 MG/5ML
5 SOLUTION ORAL 3 TIMES DAILY
Qty: 450 ML | Refills: 0 | Status: SHIPPED | OUTPATIENT
Start: 2024-01-03 | End: 2024-02-02

## 2023-12-04 RX ORDER — METHADONE HYDROCHLORIDE 5 MG/5ML
5 SOLUTION ORAL 3 TIMES DAILY
Qty: 450 ML | Refills: 0 | Status: SHIPPED | OUTPATIENT
Start: 2024-02-02 | End: 2024-03-03

## 2023-12-04 RX ORDER — METHADONE HYDROCHLORIDE 5 MG/5ML
5 SOLUTION ORAL 3 TIMES DAILY
Qty: 450 ML | Refills: 0 | Status: SHIPPED | OUTPATIENT
Start: 2023-12-04 | End: 2024-01-03

## 2023-12-04 ASSESSMENT — PATIENT HEALTH QUESTIONNAIRE - PHQ9
2. FEELING DOWN, DEPRESSED OR HOPELESS: SEVERAL DAYS
SUM OF ALL RESPONSES TO PHQ9 QUESTIONS 1 AND 2: 2
1. LITTLE INTEREST OR PLEASURE IN DOING THINGS: SEVERAL DAYS

## 2023-12-04 ASSESSMENT — PAIN DESCRIPTION - DESCRIPTORS: DESCRIPTORS: ACHING;BURNING;STABBING;THROBBING

## 2023-12-04 ASSESSMENT — ENCOUNTER SYMPTOMS
LOSS OF SENSATION IN FEET: 1
OCCASIONAL FEELINGS OF UNSTEADINESS: 1

## 2023-12-04 ASSESSMENT — PAIN - FUNCTIONAL ASSESSMENT: PAIN_FUNCTIONAL_ASSESSMENT: 0-10

## 2023-12-04 ASSESSMENT — PAIN SCALES - GENERAL: PAINLEVEL_OUTOF10: 7

## 2023-12-04 NOTE — H&P
History Of Present Illness  Rut Tapia is a 57 y.o. female presenting for medication refill   Patient is here today for a routine follow-up visit and medication refill confirming that the pain is very well under control with the usage of the current regimen of the medication there has been no signs of misuse or abuse of any prescription noted upon the review of the [place] automated reporting prescription.  I have personally reviewed the [place] Automated Prescription Report for this patient I have considered the risk of abuse, misuse, dependence, divergent and addiction.The patient denies any side effects associated with the usage of the medication patient described the medication improving the quality of life and allowing participation in day-to-day activity patient denies otherwise any new complaint patient is requesting a refill. Patient denies usage of any other opiate. Patient denies usage of any recreational drugs. Patient confirms that the opiate prescription is being used as prescribed.  Rating currently 70th percentile improvement on the current regimen of the methadone with the combination of the medication.rating pain 4        Past Medical History  Past Medical History:   Diagnosis Date    Arthritis     Chronic vascular disorders of intestine (CMS/HCC)     SMAS (superior mesenteric artery syndrome)    Narcolepsy without cataplexy     Narcolepsy    Personal history of other diseases of the nervous system and sense organs     History of cataract    Personal history of other infectious and parasitic diseases     History of staph infection    Personal history of other mental and behavioral disorders     History of dementia    Personal history of other venous thrombosis and embolism     History of deep venous thrombosis    Personal history of urinary calculi     History of kidney stones    Type 2 diabetes mellitus with diabetic neuropathy, unspecified (CMS/HCC)     Diabetes mellitus with diabetic neuropathy        Surgical History  Past Surgical History:   Procedure Laterality Date    APPENDECTOMY  2022    Appendectomy     SECTION, CLASSIC  2013     Section    CHOLECYSTECTOMY  2022    Cholecystectomy    GASTRIC BYPASS  2013    Gastric Surgery For Morbid Obesity Gastric Bypass    HYSTERECTOMY  2022    Hysterectomy    OOPHORECTOMY  2013    Oophorectomy    OTHER SURGICAL HISTORY  2022    Hernia repair    OTHER SURGICAL HISTORY  10/14/2022    Dilation and curettage    OTHER SURGICAL HISTORY  10/14/2022    Laminectomy    OTHER SURGICAL HISTORY  10/14/2022    Spinal cord stimulation    OTHER SURGICAL HISTORY  10/14/2022    Liver biopsy    OTHER SURGICAL HISTORY  10/14/2022    Cardiac catheterization    OTHER SURGICAL HISTORY  2022    Mastectomy bilateral    OTHER SURGICAL HISTORY  2022     section    OTHER SURGICAL HISTORY  2022    Gastric bypass surgery    OTHER SURGICAL HISTORY  2021    Breast biopsy    OTHER SURGICAL HISTORY  2021    Lymphatic Surgery    OTHER SURGICAL HISTORY  2019    Hysterectomy    OTHER SURGICAL HISTORY  2019    Cholecystectomy    OTHER SURGICAL HISTORY  2019    Knee surgery        Social History  She reports that she has never smoked. She has never been exposed to tobacco smoke. She has never used smokeless tobacco. She reports that she does not drink alcohol and does not use drugs.    Family History  Family History   Problem Relation Name Age of Onset    Other (blood pressure) Mother          isolated elevated    Other (blood pressure) Father          isolated elevated    Diabetes Father      Other (cardiac problems) Father          reported previous    Diabetes Sister      Ovarian cancer Sister      Other (malignant carcinoma) Sister          of the breast    Diabetes Brother      Sleep apnea Brother          nonorganic    Bipolar disorder Son      Schizophrenia Son      Breast cancer  Mother's Sister      Breast cancer Maternal Grandmother      Colon cancer Maternal Grandfather      Breast cancer Maternal Great-Grandmother          Allergies  Aspirin, Buprenorphine, Citalopram, Meperidine, Nsaids (non-steroidal anti-inflammatory drug), Penicillins, Prochlorperazine, Adhesive tape-silicones, Amitriptyline, Azithromycin, Methylprednisolone, Petroleum jelly [white petrolatum], and Tobramycin-dexamethasone    Review of Systems   All 13 systems were reviewed and are within normal levels except as noted below or per HPI. Positive and pertinent negative responses are noted below or in the HPI   Denied any fever or chills. No weight loss and no night sweats. No cough or sputum production. No diarrhea   No constipation  No bladder and bowel incontinence and no other changes in bladder and bowel. No skin changes.   Denied opioids diversion and abuse and denies alcoholism. Denies overuse of  pain medications.   Physical Exam       Past medical history no interval changes has been noted    On physical examination    General   Alert, oriented x3 pleasant and cooperative. Does not look in any major distress.    HEENT  Pupils normal in size. Ears, nose, mouth, and throat appear to be in normal condition.  Head atraumatic      No signs of sedation or signs of withdrawal apparent.    Psychiatric   No signs of depression apparent.    Neuro   No focal neurological deficit apparent. Ambulation at baseline.      Respiratory  No respiratory distress     Abdomen  no distention     Skin  No skin markings supportive of recent IV drug usage .    Cardiovascular  Regular rate and rhythm    Last Recorded Vitals  There were no vitals taken for this visit.    Relevant Results        Assessment/Plan     57 years old with history and physical examination supportive of reflex sympathetic dystrophy of the right lower extremity maintained on methadone with reasonable positive response with no signs of misuse or abuse of any  prescription    Plan  I believe the benefits of the continuation of the opiate outweighs the risk. Patient has described positive response to the opiate therapy. Patient denies any side effects associated with the usage of the opiate. Patient did not elicit any signs supportive of any opioid misuse or abuse. The review of the [place] automated reporting prescription is not suggestive of any worrisome pattern. Patient believes the usage of the opioid as improve the quality of life and allow the patient to continue to participate in day-to-day activity. Therefore I would recommend with the continuation of the opiate therapy and I will be refilling the patient prescription.    I advised the patient to always take the least needed of the medication to keep the  pain under control. I encouraged the patient to keep a pain dairy so that during subsequent visit we could look at the trend of the  response to the pain medication and titrate the medication accordingly. Patient verbalized understanding and agreement with the plan and will be following-up with  the pain clinic within 3 months duration, or if needed any sooner.      The above clinical summary has been dictated with voice recognition software. It has not been proofread for grammatical errors, typographical mistakes, or other semantic inconsistencies.    Thank you for visiting our office today. It was our pleasure to take part in your healthcare.     Please do not hesitate to contact the pain clinic after your visit with any questions or concerns at  M-F 8-4 pm       Duc Tavares M.D.  Medical Director , Division of Pain Medicine [facility]   of Anesthesiology and Pain Medicine  [facility]     [place] [address] [address] [place] [address]     Office: [phone]  Fax:  [phone]            Duc Tavares MD

## 2023-12-11 ENCOUNTER — LAB REQUISITION (OUTPATIENT)
Dept: LAB | Facility: HOSPITAL | Age: 58
End: 2023-12-11
Payer: MEDICARE

## 2023-12-11 DIAGNOSIS — I48.91 UNSPECIFIED ATRIAL FIBRILLATION (MULTI): ICD-10-CM

## 2023-12-11 LAB
INR PPP: 1.1 (ref 0.9–1.1)
PROTHROMBIN TIME: 12.4 SECONDS (ref 9.8–12.8)

## 2023-12-11 PROCEDURE — 85610 PROTHROMBIN TIME: CPT | Mod: OUT | Performed by: FAMILY MEDICINE

## 2023-12-13 LAB
HOLD SPECIMEN: NORMAL
HOLD SPECIMEN: NORMAL

## 2023-12-18 ENCOUNTER — LAB REQUISITION (OUTPATIENT)
Dept: LAB | Facility: HOSPITAL | Age: 58
End: 2023-12-18
Payer: MEDICARE

## 2023-12-18 DIAGNOSIS — I48.91 UNSPECIFIED ATRIAL FIBRILLATION (MULTI): ICD-10-CM

## 2023-12-18 LAB
INR PPP: 1.3 (ref 0.9–1.1)
PROTHROMBIN TIME: 14.2 SECONDS (ref 9.8–12.8)

## 2023-12-18 PROCEDURE — 85610 PROTHROMBIN TIME: CPT | Mod: OUT | Performed by: FAMILY MEDICINE

## 2024-01-22 DIAGNOSIS — G90.50 RSD (REFLEX SYMPATHETIC DYSTROPHY): ICD-10-CM

## 2024-01-23 RX ORDER — TOPIRAMATE 50 MG/1
50 TABLET, FILM COATED ORAL 2 TIMES DAILY
Qty: 160 TABLET | Refills: 0 | Status: SHIPPED | OUTPATIENT
Start: 2024-01-23 | End: 2024-03-18

## 2024-01-30 ENCOUNTER — HOSPITAL ENCOUNTER (OUTPATIENT)
Dept: PAIN MEDICINE | Facility: CLINIC | Age: 59
Discharge: HOME | End: 2024-01-30
Payer: COMMERCIAL

## 2024-01-30 ENCOUNTER — HOSPITAL ENCOUNTER (OUTPATIENT)
Dept: RADIOLOGY | Facility: HOSPITAL | Age: 59
Discharge: HOME | End: 2024-01-30
Payer: COMMERCIAL

## 2024-01-30 VITALS
HEART RATE: 56 BPM | TEMPERATURE: 98.2 F | OXYGEN SATURATION: 97 % | DIASTOLIC BLOOD PRESSURE: 48 MMHG | RESPIRATION RATE: 18 BRPM | SYSTOLIC BLOOD PRESSURE: 99 MMHG

## 2024-01-30 DIAGNOSIS — M47.814 SPONDYLOSIS, THORACIC: ICD-10-CM

## 2024-01-30 PROCEDURE — 2500000004 HC RX 250 GENERAL PHARMACY W/ HCPCS (ALT 636 FOR OP/ED): Performed by: PAIN MEDICINE

## 2024-01-30 PROCEDURE — 64634 DESTROY C/TH FACET JNT ADDL: CPT

## 2024-01-30 PROCEDURE — 64633 DESTROY CERV/THOR FACET JNT: CPT | Performed by: PAIN MEDICINE

## 2024-01-30 PROCEDURE — 99152 MOD SED SAME PHYS/QHP 5/>YRS: CPT | Performed by: PAIN MEDICINE

## 2024-01-30 PROCEDURE — 64633 DESTROY CERV/THOR FACET JNT: CPT

## 2024-01-30 PROCEDURE — 64634 DESTROY C/TH FACET JNT ADDL: CPT | Performed by: PAIN MEDICINE

## 2024-01-30 PROCEDURE — 2500000005 HC RX 250 GENERAL PHARMACY W/O HCPCS: Performed by: PAIN MEDICINE

## 2024-01-30 RX ORDER — BUPIVACAINE HYDROCHLORIDE 5 MG/ML
10 INJECTION, SOLUTION PERINEURAL ONCE
Status: COMPLETED | OUTPATIENT
Start: 2024-01-30 | End: 2024-01-30

## 2024-01-30 RX ORDER — LIDOCAINE IN NACL,ISO-OSMOT/PF 30 MG/3 ML
10 SYRINGE (ML) INJECTION ONCE
Status: COMPLETED | OUTPATIENT
Start: 2024-01-30 | End: 2024-01-30

## 2024-01-30 RX ORDER — MIDAZOLAM HYDROCHLORIDE 1 MG/ML
2 INJECTION INTRAMUSCULAR; INTRAVENOUS
Status: DISCONTINUED | OUTPATIENT
Start: 2024-01-30 | End: 2024-01-31 | Stop reason: HOSPADM

## 2024-01-30 RX ADMIN — MIDAZOLAM HYDROCHLORIDE 2 MG: 1 INJECTION INTRAMUSCULAR; INTRAVENOUS at 10:53

## 2024-01-30 RX ADMIN — BUPIVACAINE HYDROCHLORIDE 50 MG: 5 INJECTION, SOLUTION PERINEURAL at 10:44

## 2024-01-30 RX ADMIN — Medication 100 MG: at 10:45

## 2024-01-30 RX ADMIN — MIDAZOLAM HYDROCHLORIDE 2 MG: 1 INJECTION INTRAMUSCULAR; INTRAVENOUS at 10:44

## 2024-01-30 ASSESSMENT — COLUMBIA-SUICIDE SEVERITY RATING SCALE - C-SSRS
1. IN THE PAST MONTH, HAVE YOU WISHED YOU WERE DEAD OR WISHED YOU COULD GO TO SLEEP AND NOT WAKE UP?: NO
6. HAVE YOU EVER DONE ANYTHING, STARTED TO DO ANYTHING, OR PREPARED TO DO ANYTHING TO END YOUR LIFE?: NO
2. HAVE YOU ACTUALLY HAD ANY THOUGHTS OF KILLING YOURSELF?: NO

## 2024-01-30 ASSESSMENT — PAIN - FUNCTIONAL ASSESSMENT: PAIN_FUNCTIONAL_ASSESSMENT: 0-10

## 2024-01-30 ASSESSMENT — PAIN SCALES - GENERAL: PAINLEVEL_OUTOF10: 6

## 2024-01-30 NOTE — OP NOTE
Surgical Indications  The patient is here today to receive a radiofrequency ablation of the bilateral T10 and T11 T11-T12 to assist with the pain condition.  Versed 4 mg     Operative Report  The patient was taken to the procedure room, was placed into a prone positioning. The skin was prepped and draped sterilely in the normal fashion. Under fluoroscopic guidance the medial nerve root branches were identified and the patient was throughout the procedure monitored by the nursing staff. The skin and subcutaneous tissues were anesthetized with 1% lidocaine. Then 20-gauge RF needles were introduced into the approximation of the medial nerve branches at the above mentioned level and confirmed in AP and oblique view, with negative aspiration of heme and CSF, with positive sensory stimulation and negative motor stimulation. Then the patient received 1 mL of 0.5% bupivacaine per site and radiofrequency ablation at temperature of 80°C for 90 seconds was achieved. Patient tolerated the procedure well, was taken to the recovery room, allowed to recover for a sufficient amount of time and then was discharged home in stable condition. The patient was advised to followup with the [facility] to reassess the improvement on as-needed basis. Take you for allowing me to participate in the care of this patient.

## 2024-01-30 NOTE — DISCHARGE INSTRUCTIONS
Post-injection instructions FOR Radiofrequency Ablation    Your radiofrequency ablation was completed today is not unusual to have some exacerbation of the pain before you get better.  Radiofrequency ablation takes an average of 2 to 3 weeks before it completely starts showing full results      Activity:  RETURN TO NORMAL ACTIVITY once the sedation is completely worn off do not sign any legal document for the first 24 hours do not drive or operate machinery for the first 24 hours until the sedation effect is completely worn off    Bandages: Remove after 24 hours     Showering/Bathing: You may shower after bandage is removed     May use ice at the site of the injection for the first 24 hours      Call the OFFICE immediately: if you notice:     Excessive bleeding from procedure site (brisk bright red bleeding from the site or bleeding that soaks the bandages or does not stop)   Severe headache  Inability to walk, leg or arm weakness or numbness that is worse after the procedure   Uncontrolled pain   New urinary or fecal incontinence   Signs of infection: Fever above 101.5F, redness, swelling, pus or drainage from the site    Please contact the pain clinic at [phone]  in one week to report results or with any further questions or concerns

## 2024-01-31 ENCOUNTER — TELEPHONE (OUTPATIENT)
Dept: PAIN MEDICINE | Facility: CLINIC | Age: 59
End: 2024-01-31
Payer: COMMERCIAL

## 2024-01-31 NOTE — TELEPHONE ENCOUNTER
"Patient has called in to inquire if she can do warm water therapy on Friday after having rfa yesterday.  I asked Dr Tavares and he states it is ok.  When calling patient back to relay this to  her, she is informing me of having a temp of 99.5.  States home care nurse was there today to draw coumadin levels and the rn assessed her back and the site has no signs or symptoms of infection and \"looks good\".  Rut states she will keep checking her temperatures and keep an eye on the site and will amrit us with any continuation and or increase in her temperature or any other signs,symptoms of infection or concerns.  She states she was being checked for a cat bite that she was on antibiotics and comadin for but that the cat bite wound has healed and is not on antibiotics anymore.  She states she will call with any questions and concerns.  "

## 2024-02-26 ENCOUNTER — OFFICE VISIT (OUTPATIENT)
Dept: PAIN MEDICINE | Facility: CLINIC | Age: 59
End: 2024-02-26
Payer: MEDICARE

## 2024-02-26 DIAGNOSIS — G90.529 COMPLEX REGIONAL PAIN SYNDROME TYPE 1 OF LOWER EXTREMITY, UNSPECIFIED LATERALITY: Primary | ICD-10-CM

## 2024-02-26 PROCEDURE — 99213 OFFICE O/P EST LOW 20 MIN: CPT | Performed by: PAIN MEDICINE

## 2024-02-26 PROCEDURE — 4010F ACE/ARB THERAPY RXD/TAKEN: CPT | Performed by: PAIN MEDICINE

## 2024-02-26 PROCEDURE — 1036F TOBACCO NON-USER: CPT | Performed by: PAIN MEDICINE

## 2024-02-26 RX ORDER — FENTANYL 25 UG/1
1 PATCH TRANSDERMAL
Qty: 10 PATCH | Refills: 0 | Status: SHIPPED | OUTPATIENT
Start: 2024-02-26 | End: 2024-03-25 | Stop reason: SDUPTHER

## 2024-02-26 ASSESSMENT — PAIN DESCRIPTION - DESCRIPTORS: DESCRIPTORS: ACHING;BURNING;SHARP;STABBING

## 2024-02-26 ASSESSMENT — PAIN SCALES - GENERAL: PAINLEVEL_OUTOF10: 7

## 2024-02-26 ASSESSMENT — PATIENT HEALTH QUESTIONNAIRE - PHQ9
SUM OF ALL RESPONSES TO PHQ9 QUESTIONS 1 AND 2: 2
1. LITTLE INTEREST OR PLEASURE IN DOING THINGS: SEVERAL DAYS
2. FEELING DOWN, DEPRESSED OR HOPELESS: SEVERAL DAYS

## 2024-02-26 ASSESSMENT — COLUMBIA-SUICIDE SEVERITY RATING SCALE - C-SSRS
2. HAVE YOU ACTUALLY HAD ANY THOUGHTS OF KILLING YOURSELF?: NO
1. IN THE PAST MONTH, HAVE YOU WISHED YOU WERE DEAD OR WISHED YOU COULD GO TO SLEEP AND NOT WAKE UP?: NO
6. HAVE YOU EVER DONE ANYTHING, STARTED TO DO ANYTHING, OR PREPARED TO DO ANYTHING TO END YOUR LIFE?: NO

## 2024-02-26 ASSESSMENT — PAIN - FUNCTIONAL ASSESSMENT: PAIN_FUNCTIONAL_ASSESSMENT: 0-10

## 2024-02-26 ASSESSMENT — ENCOUNTER SYMPTOMS
OCCASIONAL FEELINGS OF UNSTEADINESS: 1
LOSS OF SENSATION IN FEET: 1

## 2024-02-26 NOTE — H&P
History Of Present Illness  Rut Tapia is a 58 y.o. female presenting for medication refill   Due to her CRPS she does not believe that her pain in her leg is well under control and she is willing to try a medication different than the methadone she has been on the methadone for a while and she believed that she may have built for it some tolerance she still have some difficulty with the swallowing and therefore she would prefer to go with the route of the patch she does not have any objection to be tried on a fentanyl patch she denies being on any patches in the past currently she is on the methadone 5 mg per 5 mL taking it 3 times per day I did review an [place] automated reporting prescription showing no signs of misuse or abuse of any prescription she also continues to be on the gabapentin 250 mg per 5 mL taking it also 3 times per day and she described the gabapentin as beneficial she is interested in ketamine infusion she believes the ketamine has helped her in the past tremendously and she is trying to find a clinic that is willing to provide her with the infusion    Pain Assessment as of today    Pain Assessment   Pain Assessment 0-10   Pain Score 7   Pain Type Chronic pain   Pain Location Arm   Pain Orientation Right   Pain Radiating Towards RIGHT ARM , RIGHT HIP   Pain Descriptors Aching, Burning, Sharp, Stabbing   Pain Frequency Constant/continuous   Aggravating Factors Walking, Stairs, Squatting, Kneeling, Stretching, Bending, Exercise, Standing   Work-Related Injury Yes   Pain Interventions Medication (See MAR), Heat applied        Past Medical History  Past Medical History:   Diagnosis Date    Arthritis     Chronic vascular disorders of intestine (CMS/HCC)     SMAS (superior mesenteric artery syndrome)    Narcolepsy without cataplexy     Narcolepsy    Personal history of other diseases of the nervous system and sense organs     History of cataract    Personal history of other infectious and  parasitic diseases     History of staph infection    Personal history of other mental and behavioral disorders     History of dementia    Personal history of other venous thrombosis and embolism     History of deep venous thrombosis    Personal history of urinary calculi     History of kidney stones    Type 2 diabetes mellitus with diabetic neuropathy, unspecified (CMS/HCC)     Diabetes mellitus with diabetic neuropathy       Surgical History  Past Surgical History:   Procedure Laterality Date    APPENDECTOMY  2022    Appendectomy     SECTION, CLASSIC  2013     Section    CHOLECYSTECTOMY  2022    Cholecystectomy    GASTRIC BYPASS  2013    Gastric Surgery For Morbid Obesity Gastric Bypass    HYSTERECTOMY  2022    Hysterectomy    OOPHORECTOMY  2013    Oophorectomy    OTHER SURGICAL HISTORY  2022    Hernia repair    OTHER SURGICAL HISTORY  10/14/2022    Dilation and curettage    OTHER SURGICAL HISTORY  10/14/2022    Laminectomy    OTHER SURGICAL HISTORY  10/14/2022    Spinal cord stimulation    OTHER SURGICAL HISTORY  10/14/2022    Liver biopsy    OTHER SURGICAL HISTORY  10/14/2022    Cardiac catheterization    OTHER SURGICAL HISTORY  2022    Mastectomy bilateral    OTHER SURGICAL HISTORY  2022     section    OTHER SURGICAL HISTORY  2022    Gastric bypass surgery    OTHER SURGICAL HISTORY  2021    Breast biopsy    OTHER SURGICAL HISTORY  2021    Lymphatic Surgery    OTHER SURGICAL HISTORY  2019    Hysterectomy    OTHER SURGICAL HISTORY  2019    Cholecystectomy    OTHER SURGICAL HISTORY  2019    Knee surgery        Social History  She reports that she has never smoked. She has never been exposed to tobacco smoke. She has never used smokeless tobacco. She reports that she does not drink alcohol and does not use drugs.    Family History  Family History   Problem Relation Name Age of Onset    Other (blood  pressure) Mother          isolated elevated    Other (blood pressure) Father          isolated elevated    Diabetes Father      Other (cardiac problems) Father          reported previous    Diabetes Sister      Ovarian cancer Sister      Other (malignant carcinoma) Sister          of the breast    Diabetes Brother      Sleep apnea Brother          nonorganic    Bipolar disorder Son      Schizophrenia Son      Breast cancer Mother's Sister      Breast cancer Maternal Grandmother      Colon cancer Maternal Grandfather      Breast cancer Maternal Great-Grandmother          Allergies  Aspirin, Buprenorphine, Citalopram, Meperidine, Nsaids (non-steroidal anti-inflammatory drug), Penicillins, Prochlorperazine, Adhesive tape-silicones, Amitriptyline, Azithromycin, Methylprednisolone, Petroleum jelly [white petrolatum], and Tobramycin-dexamethasone    Review of Systems   All 13 systems were reviewed and are within normal levels except as noted below or per HPI. Positive and pertinent negative responses are noted below or in the HPI   Denied any fever or chills. No weight loss and no night sweats. No cough or sputum production. No diarrhea   No constipation  No bladder and bowel incontinence and no other changes in bladder and bowel. No skin changes.   Denied opioids diversion and abuse and denies alcoholism. Denies overuse of  pain medications.   Physical Exam       Past medical history no interval changes has been noted    On physical examination    General   Alert, oriented x3 pleasant and cooperative. Does not look in any major distress.    HEENT  Pupils normal in size. Ears, nose, mouth, and throat appear to be in normal condition.  Head atraumatic      No signs of sedation or signs of withdrawal apparent.    Psychiatric   No signs of depression apparent.    Neuro   No focal neurological deficit apparent. Ambulation with a walker       Respiratory  No respiratory distress     Abdomen  no distention     Skin  No skin  markings supportive of recent IV drug usage .    Cardiovascular  Regular rate and rhythm    Last Recorded Vitals  There were no vitals taken for this visit.    Relevant Results           Assessment/Plan     58 years old with history and physical examination supportive of CRPS requiring maintenance on opiate therapy    Plan  Knowing that the patient has built some tolerance to the methadone I would recommend for this month to give her a trial of fentanyl patches 25 mcg/h every 72 hours I will issue for her a 1 month supply of the fentanyl she will keep a pain diary she understand that she would not be mixing any other opiate with it and she would not be using any leftover methadone that she have right now with the usage of the fentanyl and she will bring the leftover methadone back to the clinic I will reevaluate her in 1 month duration to reassess her improvement and at that time we will decide on the next step based on her results I encouraged her to seek ketamine infusion if possible.      The above clinical summary has been dictated with voice recognition software. It has not been proofread for grammatical errors, typographical mistakes, or other semantic inconsistencies.    Thank you for visiting our office today. It was our pleasure to take part in your healthcare.     Please do not hesitate to contact the pain clinic after your visit with any questions or concerns at  M-F 8-4 pm       Duc Tavares M.D.  Medical Director , Division of Pain Medicine [facility]   of Anesthesiology and Pain Medicine  [facility]     [place] [address] [address] [place], [place] [id number]     Office: [phone]  Fax: [phone]           Duc Tavares MD

## 2024-03-25 ENCOUNTER — OFFICE VISIT (OUTPATIENT)
Dept: PAIN MEDICINE | Facility: CLINIC | Age: 59
End: 2024-03-25
Payer: COMMERCIAL

## 2024-03-25 ENCOUNTER — OFFICE VISIT (OUTPATIENT)
Dept: URGENT CARE | Facility: CLINIC | Age: 59
End: 2024-03-25

## 2024-03-25 VITALS
TEMPERATURE: 97.7 F | DIASTOLIC BLOOD PRESSURE: 70 MMHG | OXYGEN SATURATION: 98 % | WEIGHT: 249 LBS | SYSTOLIC BLOOD PRESSURE: 104 MMHG | HEART RATE: 55 BPM | BODY MASS INDEX: 44.11 KG/M2

## 2024-03-25 DIAGNOSIS — J01.90 ACUTE SINUSITIS, RECURRENCE NOT SPECIFIED, UNSPECIFIED LOCATION: Primary | ICD-10-CM

## 2024-03-25 DIAGNOSIS — G90.529 COMPLEX REGIONAL PAIN SYNDROME TYPE 1 OF LOWER EXTREMITY, UNSPECIFIED LATERALITY: ICD-10-CM

## 2024-03-25 PROCEDURE — 99203 OFFICE O/P NEW LOW 30 MIN: CPT | Performed by: PHYSICIAN ASSISTANT

## 2024-03-25 PROCEDURE — 99214 OFFICE O/P EST MOD 30 MIN: CPT | Performed by: PAIN MEDICINE

## 2024-03-25 PROCEDURE — G2211 COMPLEX E/M VISIT ADD ON: HCPCS | Performed by: PAIN MEDICINE

## 2024-03-25 PROCEDURE — 1036F TOBACCO NON-USER: CPT | Performed by: PHYSICIAN ASSISTANT

## 2024-03-25 PROCEDURE — 3078F DIAST BP <80 MM HG: CPT | Performed by: PHYSICIAN ASSISTANT

## 2024-03-25 PROCEDURE — 4010F ACE/ARB THERAPY RXD/TAKEN: CPT | Performed by: PHYSICIAN ASSISTANT

## 2024-03-25 PROCEDURE — 3074F SYST BP LT 130 MM HG: CPT | Performed by: PHYSICIAN ASSISTANT

## 2024-03-25 RX ORDER — DOXYCYCLINE 100 MG/1
100 CAPSULE ORAL 2 TIMES DAILY
Qty: 20 CAPSULE | Refills: 0 | Status: SHIPPED | OUTPATIENT
Start: 2024-03-25 | End: 2024-04-04

## 2024-03-25 RX ORDER — FENTANYL 25 UG/1
1 PATCH TRANSDERMAL
Qty: 10 PATCH | Refills: 0 | Status: SHIPPED | OUTPATIENT
Start: 2024-03-27

## 2024-03-25 RX ORDER — FENTANYL 25 UG/1
1 PATCH TRANSDERMAL
Qty: 10 PATCH | Refills: 0 | Status: SHIPPED | OUTPATIENT
Start: 2024-05-26

## 2024-03-25 RX ORDER — DONEPEZIL HYDROCHLORIDE 10 MG/1
10 TABLET, FILM COATED ORAL NIGHTLY
COMMUNITY

## 2024-03-25 RX ORDER — BENZONATATE 100 MG/1
100 CAPSULE ORAL 3 TIMES DAILY PRN
Qty: 30 CAPSULE | Refills: 0 | Status: SHIPPED | OUTPATIENT
Start: 2024-03-25 | End: 2024-04-04

## 2024-03-25 RX ORDER — FENTANYL 25 UG/1
1 PATCH TRANSDERMAL
Qty: 10 PATCH | Refills: 0 | Status: SHIPPED | OUTPATIENT
Start: 2024-04-26

## 2024-03-25 ASSESSMENT — ENCOUNTER SYMPTOMS
HEMOPTYSIS: 0
SYNCOPE: 0
SHORTNESS OF BREATH: 1
OCCASIONAL FEELINGS OF UNSTEADINESS: 1
ORTHOPNEA: 0
FEVER: 1
SWOLLEN GLANDS: 1
WHEEZING: 0
CLAUDICATION: 1
LEG PAIN: 1
NECK PAIN: 1
PND: 0
SORE THROAT: 1
LOSS OF SENSATION IN FEET: 1
VOMITING: 0
SPUTUM PRODUCTION: 1
HEADACHES: 1
RHINORRHEA: 1

## 2024-03-25 ASSESSMENT — PAIN - FUNCTIONAL ASSESSMENT: PAIN_FUNCTIONAL_ASSESSMENT: 0-10

## 2024-03-25 ASSESSMENT — COLUMBIA-SUICIDE SEVERITY RATING SCALE - C-SSRS
1. IN THE PAST MONTH, HAVE YOU WISHED YOU WERE DEAD OR WISHED YOU COULD GO TO SLEEP AND NOT WAKE UP?: NO
2. HAVE YOU ACTUALLY HAD ANY THOUGHTS OF KILLING YOURSELF?: NO
6. HAVE YOU EVER DONE ANYTHING, STARTED TO DO ANYTHING, OR PREPARED TO DO ANYTHING TO END YOUR LIFE?: NO

## 2024-03-25 ASSESSMENT — PATIENT HEALTH QUESTIONNAIRE - PHQ9
2. FEELING DOWN, DEPRESSED OR HOPELESS: SEVERAL DAYS
1. LITTLE INTEREST OR PLEASURE IN DOING THINGS: NOT AT ALL
SUM OF ALL RESPONSES TO PHQ9 QUESTIONS 1 AND 2: 1

## 2024-03-25 ASSESSMENT — PAIN SCALES - GENERAL: PAINLEVEL_OUTOF10: 7

## 2024-03-25 ASSESSMENT — PAIN DESCRIPTION - DESCRIPTORS: DESCRIPTORS: ACHING;BURNING;SHARP;SHOOTING;RADIATING

## 2024-03-25 NOTE — H&P
History Of Present Illness  Rut Tapia is a 58 y.o. female with CRPS during the last visit she was switched from the methadone to the fentanyl currently at 25 mcg/h every 72 hours that she is here today reporting 75 percentile improvement with the usage of the fentanyl patches that she is applying every 3 days she is reporting no significant side effect associated with the usage of the fentanyl patch she has been complaining of some spasm in her upper and lower extremities she is currently on the baclofen 10 mg 3 times per day with without any significant side effects reported  She reported an improvement in her walking she was able to walk longer with a cane and even family members had commented to her about an improvement in her quality of life she is so far doing reasonably well on the fentanyl patch  Pain Assessment   Pain Assessment 0-10   Pain Score 7   Pain Type Chronic pain   Pain Location Leg   Pain Orientation Right   Pain Descriptors Aching, Burning, Sharp, Shooting, Radiating   Pain Frequency Constant/continuous   Pain Onset Ongoing   Clinical Progression Not changed   Aggravating Factors Walking   Work-Related Injury Yes   Pain Interventions Medication (See MAR), Heat applied        Past Medical History  Past Medical History:   Diagnosis Date    Arthritis     Chronic vascular disorders of intestine (CMS/HCC)     SMAS (superior mesenteric artery syndrome)    Narcolepsy without cataplexy     Narcolepsy    Personal history of other diseases of the nervous system and sense organs     History of cataract    Personal history of other infectious and parasitic diseases     History of staph infection    Personal history of other mental and behavioral disorders     History of dementia    Personal history of other venous thrombosis and embolism     History of deep venous thrombosis    Personal history of urinary calculi     History of kidney stones    Type 2 diabetes mellitus with diabetic neuropathy,  unspecified (CMS/HCC)     Diabetes mellitus with diabetic neuropathy       Surgical History  Past Surgical History:   Procedure Laterality Date    APPENDECTOMY  2022    Appendectomy     SECTION, CLASSIC  2013     Section    CHOLECYSTECTOMY  2022    Cholecystectomy    GASTRIC BYPASS  2013    Gastric Surgery For Morbid Obesity Gastric Bypass    HYSTERECTOMY  2022    Hysterectomy    OOPHORECTOMY  2013    Oophorectomy    OTHER SURGICAL HISTORY  2022    Hernia repair    OTHER SURGICAL HISTORY  10/14/2022    Dilation and curettage    OTHER SURGICAL HISTORY  10/14/2022    Laminectomy    OTHER SURGICAL HISTORY  10/14/2022    Spinal cord stimulation    OTHER SURGICAL HISTORY  10/14/2022    Liver biopsy    OTHER SURGICAL HISTORY  10/14/2022    Cardiac catheterization    OTHER SURGICAL HISTORY  2022    Mastectomy bilateral    OTHER SURGICAL HISTORY  2022     section    OTHER SURGICAL HISTORY  2022    Gastric bypass surgery    OTHER SURGICAL HISTORY  2021    Breast biopsy    OTHER SURGICAL HISTORY  2021    Lymphatic Surgery    OTHER SURGICAL HISTORY  2019    Hysterectomy    OTHER SURGICAL HISTORY  2019    Cholecystectomy    OTHER SURGICAL HISTORY  2019    Knee surgery        Social History  She reports that she has never smoked. She has never been exposed to tobacco smoke. She has never used smokeless tobacco. She reports that she does not drink alcohol and does not use drugs.    Family History  Family History   Problem Relation Name Age of Onset    Other (blood pressure) Mother          isolated elevated    Other (blood pressure) Father          isolated elevated    Diabetes Father      Other (cardiac problems) Father          reported previous    Diabetes Sister      Ovarian cancer Sister      Other (malignant carcinoma) Sister          of the breast    Diabetes Brother      Sleep apnea Brother          nonorganic     Bipolar disorder Son      Schizophrenia Son      Breast cancer Mother's Sister      Breast cancer Maternal Grandmother      Colon cancer Maternal Grandfather      Breast cancer Maternal Great-Grandmother          Allergies  Aspirin, Buprenorphine, Citalopram, Meperidine, Nsaids (non-steroidal anti-inflammatory drug), Penicillins, Prochlorperazine, Adhesive tape-silicones, Amitriptyline, Azithromycin, Methylprednisolone, Petroleum jelly [white petrolatum], and Tobramycin-dexamethasone    Review of Systems   All 13 systems were reviewed and are within normal levels except as noted below or per HPI. Positive and pertinent negative responses are noted below or in the HPI   Denied any fever or chills. No weight loss and no night sweats. No cough or sputum production. No diarrhea   No constipation  No bladder and bowel incontinence and no other changes in bladder and bowel. No skin changes.   Denied opioids diversion and abuse and denies alcoholism. Denies overuse of  pain medications.   Physical Exam       Past medical history no interval changes has been noted    On physical examination    General   Alert, oriented x3 pleasant and cooperative. Does not look in any major distress.    HEENT  Pupils normal in size. Ears, nose, mouth, and throat appear to be in normal condition.  Head atraumatic      No signs of sedation or signs of withdrawal apparent.    Psychiatric   No signs of depression apparent.    Neuro  Neurologically unchanged able to ambulate with the assistance of the walker      Respiratory  No respiratory distress     Abdomen  no distention     Skin  No skin markings supportive of recent IV drug usage .    Cardiovascular  Regular rate and rhythm    Last Recorded Vitals  There were no vitals taken for this visit.    Relevant Results           Assessment/Plan        58 years old with history and physical examination supportive of CRPS requiring maintenance on opiate therapy     Plan  The most applicable  treatment options considered and discussed with the patient at this time, including a combination of physical therapy approaches, psychological/psychiatric approaches, pharmacologic management, interventional procedures and pain management surgeries (such as spinal cord stimulation and intrathecal pump placement. Risk of complications and/or morbidity and mortality is high given the acute and chronic pain may pose a threat to life and bodily functions if undertreated, poorly treated or with failure to maintain adequate and timely follow-up. Given the serious and fluctuating nature of pain (with extensive considerations whenever pain changes, worsens and even if it improves), there always remains the possibility of prolonged functional impairment requiring constant patient re-assessment and high level medical decision making. The amount and complexity of data reviewed is high given the patient labs, radiology reports and other tests were obtained and reviewed (summarized as applicable). Pertinent positive and negative findings were considered in medical decision making.     The patient was counseled regarding diagnostic results, instructions for management, risk factor reductions, prognosis, patient and family education, impressions, risks and benefits of treatment options and importance of compliance with treatment.        The level of clinical decision making in this office visit, is high, given the high risks of complications with the morbidity and mortality due to the fact that acute and chronic pain may pose a threat to the life and bodily function, if under treated, poorly treated, or with failure to maintain adequate treatment and timely medical follow up. Additionally over treatment has its own set of complications including overdosing on the pain medications and also the habit forming effects of the medications used to treat chronic painful conditions including therapeutic classes classified as dangerous  medications. Given the serious and fluctuating nature of pain with extensive consideration for whenever pain changes, there is always the risk of prolonged functional impairment requiring close patient assessment and reassessment and high level medical decision making at every office visit. The amount and complexity of data reviewed is high given the patient clinical presentation, labs,  data, radiology reports, and other tests as above. Pertinent data whether positive or negative were taken in consideration in the process of making this high level medical decision.   Factors to be considered the chronicity and the severity of the symptoms and signs associated comorbidity and differential diagnosis motivation to get in treatment response to treatment adherence to treatment recommendation and using skills.  The patient's verbal consent was obtained.  Discussed the pharmacological treatment versus the nonpharmacological treatment for current pain condition will continue the combination of the current treatment.  The typical short and long-term side effects of the proposed medication regimen including contraindications and clinically significant interactions were discussed with the patient.  Examples of side effects include but are not limited to sedation overdose with the usage of the opiate constipation mood changes sedation impaired judgment.  I advised the patient not to drive or drink while taking these medication.  The patient was advised to stop taking any medication if they have acute severe side effects and to reach out to our office with question the patient denied any contraindication to this treatment.  Targeted symptoms and signs possible therapeutic benefits side effects and risks were discussed.  I believe the benefits of the continuation of the opiate outweighs the risk. Patient has described positive response to the opiate therapy. Patient denies any side effects associated with the usage of the  opiate. Patient did not elicit any signs supportive of any opioid misuse or abuse. The review of the [place] automated reporting prescription is not suggestive of any worrisome pattern. Patient believes the usage of the opioid as improve the quality of life and allow the patient to continue to participate in day-to-day activity. Therefore I would recommend with the continuation of the opiate therapy and I will be refilling the patient prescription.   Fentanyl 25 mcg/h every 72 hours I advised her to increase the dose of the baclofen to 10 mg 4 times per day she will keep a pain diary and follow-up with the pain clinic within 3 months or if needed any sooner       The above clinical summary has been dictated with voice recognition software. It has not been proofread for grammatical errors, typographical mistakes, or other semantic inconsistencies.    Thank you for visiting our office today. It was our pleasure to take part in your healthcare.     Please do not hesitate to contact the pain clinic after your visit with any questions or concerns at  M-F 8-4 pm       Duc Tavares M.D.  Medical Director , Division of Pain Medicine [facility]   of Anesthesiology and Pain Medicine  [facility] [employer]     [place] [address] [address] [place] [address]     Office: [phone]  Fax: [phone]             Duc Tavares MD

## 2024-03-27 PROBLEM — J01.90 ACUTE SINUSITIS: Status: ACTIVE | Noted: 2024-03-27

## 2024-03-27 ASSESSMENT — ENCOUNTER SYMPTOMS
HEMOPTYSIS: 0
SYNCOPE: 0
WHEEZING: 0
SWOLLEN GLANDS: 1
VOMITING: 0
SPUTUM PRODUCTION: 1
RHINORRHEA: 1
PND: 0
FEVER: 1
SHORTNESS OF BREATH: 1
SORE THROAT: 1
NECK PAIN: 1
HEADACHES: 1
CLAUDICATION: 1

## 2024-03-27 NOTE — PROGRESS NOTES
Subjective   Patient ID: Rut Tapia is a 58 y.o. female.    Patient is a 58-year-old female who complains of congestion, sinus pressure and cough that she has been experiencing for the past 1 week.  Patient also describes ear pain, ear pressure and sore throat.      URI  Presenting symptoms: ear pain, fever, rhinorrhea and sore throat    Associated symptoms: headaches, neck pain and swollen glands    Associated symptoms: no wheezing    Shortness of Breath  Progression:  Gradually worsening  Chronicity:  New  Worsened by:  Odors, URIs and fumes  Associated symptoms: chest pain, claudication, ear pain, fever, headaches, neck pain, sore throat, sputum production and swollen glands    Associated symptoms: no hemoptysis, no PND, no rash, no syncope, no vomiting and no wheezing    The following portions of the chart were reviewed this encounter and updated as appropriate:       Review of Systems   Constitutional:  Positive for fever.        ROS below was completed by the patient.   HENT:  Positive for ear pain, rhinorrhea and sore throat.    Respiratory:  Positive for sputum production and shortness of breath. Negative for hemoptysis and wheezing.    Cardiovascular:  Positive for chest pain and claudication. Negative for leg swelling, syncope and PND.   Gastrointestinal:  Negative for vomiting.   Musculoskeletal:  Positive for neck pain.   Skin:  Negative for rash.   Neurological:  Positive for headaches.   All other systems reviewed and are negative.    Objective   Physical Exam  Vitals and nursing note reviewed.   Constitutional:       Appearance: Normal appearance. She is normal weight.   HENT:      Head: Normocephalic and atraumatic.      Right Ear: Tympanic membrane, ear canal and external ear normal.      Left Ear: Tympanic membrane, ear canal and external ear normal.      Nose: Nose normal. No congestion or rhinorrhea.      Mouth/Throat:      Mouth: Mucous membranes are moist.      Pharynx: Oropharynx is  clear. No oropharyngeal exudate or posterior oropharyngeal erythema.   Eyes:      Extraocular Movements: Extraocular movements intact.      Conjunctiva/sclera: Conjunctivae normal.      Pupils: Pupils are equal, round, and reactive to light.   Cardiovascular:      Rate and Rhythm: Normal rate and regular rhythm.      Pulses: Normal pulses.      Heart sounds: Normal heart sounds.   Pulmonary:      Effort: Pulmonary effort is normal. No respiratory distress.      Breath sounds: Normal breath sounds. No stridor. No wheezing, rhonchi or rales.   Musculoskeletal:      Cervical back: Normal range of motion and neck supple.   Skin:     General: Skin is warm and dry.      Capillary Refill: Capillary refill takes less than 2 seconds.   Neurological:      General: No focal deficit present.      Mental Status: She is alert and oriented to person, place, and time.   Psychiatric:         Mood and Affect: Mood normal.         Behavior: Behavior normal.         Thought Content: Thought content normal.         Judgment: Judgment normal.     Assessment/Plan   Physical exam findings as noted above.  Patient has multiple antibiotic allergies and was provided with prescriptions for doxycycline 100 mg and Tessalon 100 mg.  Supportive care instructions were discussed the patient verbalizes good understanding of same.    CLINICAL IMPRESSION:  Acute Sinusitis    Diagnoses and all orders for this visit:  Acute sinusitis, recurrence not specified, unspecified location  -     benzonatate (Tessalon Perles) 100 mg capsule; Take 1 capsule (100 mg) by mouth 3 times a day as needed for cough for up to 10 days.  -     doxycycline (Monodox) 100 mg capsule; Take 1 capsule (100 mg) by mouth 2 times a day for 10 days. Take with at least 8 ounces (large glass) of water, do not lie down for 30 minutes after      Patient disposition: Home

## 2024-04-03 DIAGNOSIS — G90.50 RSD (REFLEX SYMPATHETIC DYSTROPHY): ICD-10-CM

## 2024-04-03 RX ORDER — BACLOFEN 10 MG/1
10 TABLET ORAL 4 TIMES DAILY
Qty: 120 TABLET | Refills: 11 | Status: SHIPPED | OUTPATIENT
Start: 2024-04-03

## 2024-05-13 DIAGNOSIS — G90.50 RSD (REFLEX SYMPATHETIC DYSTROPHY): ICD-10-CM

## 2024-05-13 RX ORDER — GABAPENTIN 250 MG/5ML
15 SOLUTION ORAL 3 TIMES DAILY
Qty: 1350 ML | Refills: 0 | Status: SHIPPED | OUTPATIENT
Start: 2024-05-13

## 2024-05-17 ENCOUNTER — HOSPITAL ENCOUNTER (OUTPATIENT)
Dept: RADIOLOGY | Facility: EXTERNAL LOCATION | Age: 59
Discharge: HOME | End: 2024-05-17

## 2024-05-17 ENCOUNTER — OFFICE VISIT (OUTPATIENT)
Dept: URGENT CARE | Facility: CLINIC | Age: 59
End: 2024-05-17
Payer: COMMERCIAL

## 2024-05-17 VITALS
OXYGEN SATURATION: 97 % | DIASTOLIC BLOOD PRESSURE: 76 MMHG | TEMPERATURE: 97.9 F | SYSTOLIC BLOOD PRESSURE: 121 MMHG | HEART RATE: 78 BPM | BODY MASS INDEX: 45.53 KG/M2 | RESPIRATION RATE: 16 BRPM | WEIGHT: 257 LBS
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
## 2024-05-17 DIAGNOSIS — M25.552 LEFT HIP PAIN: Primary | ICD-10-CM

## 2024-05-17 DIAGNOSIS — S81.851A CAT BITE OF RIGHT LOWER LEG, INITIAL ENCOUNTER: ICD-10-CM

## 2024-05-17 PROCEDURE — 3078F DIAST BP <80 MM HG: CPT | Performed by: PHYSICIAN ASSISTANT

## 2024-05-17 PROCEDURE — 99213 OFFICE O/P EST LOW 20 MIN: CPT | Performed by: PHYSICIAN ASSISTANT

## 2024-05-17 PROCEDURE — 3074F SYST BP LT 130 MM HG: CPT | Performed by: PHYSICIAN ASSISTANT

## 2024-05-17 PROCEDURE — 4010F ACE/ARB THERAPY RXD/TAKEN: CPT | Performed by: PHYSICIAN ASSISTANT

## 2024-05-17 PROCEDURE — 1036F TOBACCO NON-USER: CPT | Performed by: PHYSICIAN ASSISTANT

## 2024-05-17 RX ORDER — DOXYCYCLINE 100 MG/1
100 CAPSULE ORAL 2 TIMES DAILY
Qty: 20 CAPSULE | Refills: 0 | Status: SHIPPED | OUTPATIENT
Start: 2024-05-17 | End: 2024-05-27

## 2024-05-17 ASSESSMENT — ENCOUNTER SYMPTOMS
HIP PAIN: 1
WOUND: 1

## 2024-05-17 ASSESSMENT — PAIN SCALES - GENERAL: PAINLEVEL: 7

## 2024-05-17 NOTE — PROGRESS NOTES
Subjective   Patient ID: Rut Tapia is a 58 y.o. female.    Patient is a 58-year-old female who arrives for evaluation of 2 separate complaints.  Patient states that she did sustain a cat bite to her right lower leg 2 days ago and has noted increased redness to the site.  Patient reports that pet cat bit her and that the cats immunizations are current.  Patient reports no bleeding, serous or purulent fluid to the site.  Patient also denies fever, chills or other systemic illness symptoms.  Patient also has a complaint of exterior left hip pain secondary to a fall injury that she sustained 1 month ago.  Patient states that her pain is primarily aggravated with sitting.  Patient denies pain to her lumbar back and reports no paresthesia or paralysis to her bilateral legs.  Patient has been using a wheeled walker for ambulation.  Patient states she has full control of bowel and bladder denies episodes of incontinence.  Patient has no history of fracture or surgery to her left hip.  States that her right hip is asymptomatic and nontender.  Patient does have a history of complex regional pain syndrome and is followed by  Pain Management.  Patient's last prescription for fentanyl transdermal patches was filled on 26 April 2024.      Hip Pain  Animal Bite  The following portions of the chart were reviewed this encounter and updated as appropriate:       Review of Systems   Musculoskeletal:         Left Hip Pain   Skin:  Positive for wound.        Cat Bite to Right Lower Leg   All other systems reviewed and are negative.  Objective   Physical Exam  Vitals and nursing note reviewed.   Constitutional:       Appearance: Normal appearance. She is obese.   Cardiovascular:      Pulses: Normal pulses.   Pulmonary:      Effort: Pulmonary effort is normal.      Breath sounds: Normal breath sounds.   Musculoskeletal:         General: Tenderness present. No swelling, deformity or signs of injury. Normal range of motion.       Right lower leg: No edema.      Left lower leg: No edema.   Skin:     General: Skin is warm and dry.      Capillary Refill: Capillary refill takes less than 2 seconds.      Findings: Erythema present. No bruising.      Comments: There is a small, dry puncture wound noted to the lateral aspect of the distal right lower leg.  There is no induration or fluctuance noted with palpation.  There is an approximate 20 cm region of erythema surrounding the puncture wound.  No bleeding, serous or purulent fluid is noted.  Patient demonstrates mild tenderness with palpation.  Skin is not hot to touch.  Skin to the proximal right lower leg and knee is unremarkable.   Neurological:      General: No focal deficit present.      Mental Status: She is alert and oriented to person, place, and time.   Psychiatric:         Mood and Affect: Mood normal.         Behavior: Behavior normal.         Thought Content: Thought content normal.         Judgment: Judgment normal.     Assessment/Plan   Physical exam findings as noted above.  X-ray left hip with pelvis is negative for acute findings as reported by the radiologist.  Patient was provided with a prescription for doxycycline 100 mg for the cat bite cellulitis due to the patient's significant allergy to penicillin.  Patient was very clearly advised to report to an emergency department if she notes any acute worsening of her right lower leg symptoms as at that time laboratory testing, blood cultures and IV antibiotics would be warranted.  Patient was also advised to contact her pain management physician for further management of her chronic left hip pain.  Patient verbalizes good understanding of the above instructions.    CLINICAL IMPRESSION:  Cellulitis to Cat Bite Right Lower Leg;  Left Hip Pain    Diagnoses and all orders for this visit:  Left hip pain  -     XR hip left with pelvis when performed 2 or 3 views  Cat bite of right lower leg, initial encounter  -     doxycycline  (Monodox) 100 mg capsule; Take 1 capsule (100 mg) by mouth 2 times a day for 10 days. Take with at least 8 ounces (large glass) of water, do not lie down for 30 minutes after    Patient disposition: Home

## 2024-06-17 ENCOUNTER — APPOINTMENT (OUTPATIENT)
Dept: PAIN MEDICINE | Facility: CLINIC | Age: 59
End: 2024-06-17
Payer: COMMERCIAL

## 2024-06-17 VITALS
RESPIRATION RATE: 18 BRPM | DIASTOLIC BLOOD PRESSURE: 63 MMHG | OXYGEN SATURATION: 96 % | HEART RATE: 76 BPM | SYSTOLIC BLOOD PRESSURE: 117 MMHG | TEMPERATURE: 97.2 F

## 2024-06-17 DIAGNOSIS — G90.50 RSD (REFLEX SYMPATHETIC DYSTROPHY): Primary | ICD-10-CM

## 2024-06-17 PROCEDURE — 99213 OFFICE O/P EST LOW 20 MIN: CPT | Performed by: NURSE PRACTITIONER

## 2024-06-17 RX ORDER — FENTANYL 25 UG/1
1 PATCH TRANSDERMAL
Qty: 10 PATCH | Refills: 0 | Status: SHIPPED | OUTPATIENT
Start: 2024-06-17 | End: 2024-07-17

## 2024-06-17 RX ORDER — GABAPENTIN 250 MG/5ML
15 SOLUTION ORAL 3 TIMES DAILY
Qty: 1350 ML | Refills: 2 | Status: SHIPPED | OUTPATIENT
Start: 2024-06-17

## 2024-06-17 RX ORDER — FENTANYL 25 UG/1
1 PATCH TRANSDERMAL
Qty: 10 PATCH | Refills: 0 | Status: SHIPPED | OUTPATIENT
Start: 2024-07-17 | End: 2024-08-16

## 2024-06-17 RX ORDER — FENTANYL 25 UG/1
1 PATCH TRANSDERMAL
Qty: 10 PATCH | Refills: 0 | Status: SHIPPED | OUTPATIENT
Start: 2024-08-16 | End: 2024-09-15

## 2024-06-17 NOTE — PROGRESS NOTES
3 month medication refill  
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 Patient deffered further work up including a therapeutic paracentesis. Follow up with PCP and IR as outpatient if patient is agreeable to paracentesis.   Please take pain medications as prescribed. Take Ultracet and if pain is still uncontrolled then take Percocet. Please take laxative to prevent constipation.

## 2024-06-18 NOTE — H&P
History Of Present Illness  Rut Tapia is a 58 y.o. female presenting   For follow up, refill of medications.  She is known in this clinic because of chronic pain of the lower extremities. She is now maintained on Fentanyl patch 25 mcgs every 72 hours and Gabapentin 250 mg TID. She described positive response to the present medication therapy. Denies any side effects associated with the usage of the medication.   She is able to perform ADL on independent basis.  She is able to do more activities/improvement in her day to day life. Her level of pain today is about 7/10 mostly in her right leg.     Past Medical History  Past Medical History:   Diagnosis Date    Arthritis     Chronic vascular disorders of intestine (Multi)     SMAS (superior mesenteric artery syndrome)    Narcolepsy without cataplexy ([facility]-H[facility])     Narcolepsy    Personal history of other diseases of the nervous system and sense organs     History of cataract    Personal history of other infectious and parasitic diseases     History of staph infection    Personal history of other mental and behavioral disorders     History of dementia    Personal history of other venous thrombosis and embolism     History of deep venous thrombosis    Personal history of urinary calculi     History of kidney stones    Type 2 diabetes mellitus with diabetic neuropathy, unspecified (Multi)     Diabetes mellitus with diabetic neuropathy       Surgical History  Past Surgical History:   Procedure Laterality Date    APPENDECTOMY  2022    Appendectomy     SECTION, CLASSIC  2013     Section    CHOLECYSTECTOMY  2022    Cholecystectomy    GASTRIC BYPASS  2013    Gastric Surgery For Morbid Obesity Gastric Bypass    HYSTERECTOMY  2022    Hysterectomy    OOPHORECTOMY  2013    Oophorectomy    OTHER SURGICAL HISTORY  2022    Hernia repair    OTHER SURGICAL HISTORY  10/14/2022    Dilation and curettage    OTHER SURGICAL  HISTORY  10/14/2022    Laminectomy    OTHER SURGICAL HISTORY  10/14/2022    Spinal cord stimulation    OTHER SURGICAL HISTORY  10/14/2022    Liver biopsy    OTHER SURGICAL HISTORY  10/14/2022    Cardiac catheterization    OTHER SURGICAL HISTORY  2022    Mastectomy bilateral    OTHER SURGICAL HISTORY  2022     section    OTHER SURGICAL HISTORY  2022    Gastric bypass surgery    OTHER SURGICAL HISTORY  2021    Breast biopsy    OTHER SURGICAL HISTORY  2021    Lymphatic Surgery    OTHER SURGICAL HISTORY  2019    Hysterectomy    OTHER SURGICAL HISTORY  2019    Cholecystectomy    OTHER SURGICAL HISTORY  2019    Knee surgery        Social History  She reports that she has never smoked. She has never been exposed to tobacco smoke. She has never used smokeless tobacco. She reports that she does not drink alcohol and does not use drugs.    Family History  Family History   Problem Relation Name Age of Onset    Other (blood pressure) Mother          isolated elevated    Other (blood pressure) Father          isolated elevated    Diabetes Father      Other (cardiac problems) Father          reported previous    Diabetes Sister      Ovarian cancer Sister      Other (malignant carcinoma) Sister          of the breast    Diabetes Brother      Sleep apnea Brother          nonorganic    Bipolar disorder Son      Schizophrenia Son      Breast cancer Mother's Sister      Breast cancer Maternal Grandmother      Colon cancer Maternal Grandfather      Breast cancer Maternal Great-Grandmother          Allergies  Aspirin, Buprenorphine, Citalopram, Meperidine, Nsaids (non-steroidal anti-inflammatory drug), Penicillins, Prochlorperazine, Adhesive tape-silicones, Amitriptyline, Azithromycin, Methylprednisolone, Petroleum jelly [white petrolatum], and Tobramycin-dexamethasone    Review of Systems   Review of systems x 10 is negative.   No recent injury or falls reported.   No recent  change in medical condition reported.   No recent weakness reported.   Still able to control bowel and bladder function.  Denies any problem with constipation.   Denies fever, cough, shortness of breath recently.   No interval change with medication/health issues reported.  Denies opioids diversion and abuse. Denies overuse of pain medications.    Physical Exam   Awake,alert, no acute distress, appropriate.  Spine is of normal curvature.  Full ROM on all 4 extremities, sensation and motor intact, no vascular compromise.  No pedal edema, normal gait, using rollator with ambulation.  Skin warm, dry, intact, turgor is normal.  Denies any numbness, tingling.    Last Recorded Vitals  Blood pressure 117/63, pulse 76, temperature 36.2 °C (97.2 °F), resp. rate 18, SpO2 96%.    Relevant Results  No recent imaging noted.     Assessment/Plan     I have personally reviewed the OARRS report for this patient. I have considered the risk of abuse, dependence, addiction and diversion.  I believe that it is clinically appropriate for this patient  to be prescribed this medication based on documented diagnosis  She is maintained on Fentanyl patch and Gabapentin. Continue Fentanyl and Gabapentin as prescribed.  She believes usage of medication improves her quality of life and allows  her to participate day to day activity.  Today she provided with Narcan.  Follow up in 3 months time or as needed basis  Explained plan to this patient, and patient verbalized understanding and agreement with the plan. If there is questions or concerns, please feel free to contact me to clarify at [phone], M-F 8-4 PM.     58 years old with history and physical examination supportive of CRPS requiring maintenance on opiate therapy . Chronic pain in lower extremities, mostly in the right, associated with CRPS type 1       I spent 30 minutes in the professional and overall care of this patient.      Lesa Thornton, APRN-CNP

## 2024-07-05 ENCOUNTER — TELEPHONE (OUTPATIENT)
Dept: PAIN MEDICINE | Facility: CLINIC | Age: 59
End: 2024-07-05
Payer: COMMERCIAL

## 2024-07-05 DIAGNOSIS — G90.50 RSD (REFLEX SYMPATHETIC DYSTROPHY): ICD-10-CM

## 2024-07-05 RX ORDER — FENTANYL 25 UG/1
1 PATCH TRANSDERMAL
Qty: 10 PATCH | Refills: 0 | Status: SHIPPED | OUTPATIENT
Start: 2024-07-05 | End: 2024-08-04

## 2024-07-05 NOTE — TELEPHONE ENCOUNTER
Patient has a prior auth not approved-reason states no risk  assessment tool.  I called Rut and asked if she would be willing to come in for an office visit so we can document that and then submit for prior auth for her fentanyl patch.  She states she will call Monday to make and appointment.

## 2024-07-05 NOTE — TELEPHONE ENCOUNTER
Hi, Rut left a message on 7/3/24 around 2:30 stating she is going to use [facility] for her fentanyl patch instead f waiting for workman's comp to approve it.  When she went to pick it up the prescription has .  It has [place] 16 days and they only go till 14 days.  Coul d you send in a new prescription?  Thanks!

## 2024-07-11 DIAGNOSIS — G90.50 RSD (REFLEX SYMPATHETIC DYSTROPHY): Primary | ICD-10-CM

## 2024-07-16 ENCOUNTER — OFFICE VISIT (OUTPATIENT)
Dept: PAIN MEDICINE | Facility: CLINIC | Age: 59
End: 2024-07-16
Payer: COMMERCIAL

## 2024-07-16 VITALS — DIASTOLIC BLOOD PRESSURE: 56 MMHG | HEART RATE: 85 BPM | SYSTOLIC BLOOD PRESSURE: 144 MMHG

## 2024-07-16 DIAGNOSIS — G90.50 COMPLEX REGIONAL PAIN SYNDROME TYPE 1, AFFECTING UNSPECIFIED SITE: Primary | ICD-10-CM

## 2024-07-16 PROCEDURE — 99203 OFFICE O/P NEW LOW 30 MIN: CPT | Performed by: NURSE PRACTITIONER

## 2024-07-16 PROCEDURE — 3077F SYST BP >= 140 MM HG: CPT | Performed by: NURSE PRACTITIONER

## 2024-07-16 PROCEDURE — 4010F ACE/ARB THERAPY RXD/TAKEN: CPT | Performed by: NURSE PRACTITIONER

## 2024-07-16 PROCEDURE — 3078F DIAST BP <80 MM HG: CPT | Performed by: NURSE PRACTITIONER

## 2024-07-16 PROCEDURE — 99213 OFFICE O/P EST LOW 20 MIN: CPT | Performed by: NURSE PRACTITIONER

## 2024-07-16 ASSESSMENT — PAIN SCALES - GENERAL: PAINLEVEL_OUTOF10: 6

## 2024-07-16 ASSESSMENT — PAIN - FUNCTIONAL ASSESSMENT: PAIN_FUNCTIONAL_ASSESSMENT: 0-10

## 2024-07-16 NOTE — H&P
History Of Present Illness  Rut Tapia is a 58 y.o. female presenting for follow up, risk assessment/drug assessment.  She is known in this clinic because of chronic right lower extremity pain. Her level of pain today is about 6/10 mostly in the lower extremities. OARRS obtained and reviewed, no abuse or misuse with prescribed medication noted.  She is maintained onFentanyl patch 25 mcgs/hr, Gabapentin 250 mg TID, and Topiramate 50 mg BID.  Described positive response to the present medication therapy. Denies any side effects associated with the usage of the medication.   She is still able to perform ADL independently.     Past Medical History  Past Medical History:   Diagnosis Date    Arthritis     Chronic vascular disorders of intestine (Multi)     SMAS (superior mesenteric artery syndrome)    Narcolepsy without cataplexy ([facility])     Narcolepsy    Personal history of other diseases of the nervous system and sense organs     History of cataract    Personal history of other infectious and parasitic diseases     History of staph infection    Personal history of other mental and behavioral disorders     History of dementia    Personal history of other venous thrombosis and embolism     History of deep venous thrombosis    Personal history of urinary calculi     History of kidney stones    Type 2 diabetes mellitus with diabetic neuropathy, unspecified (Multi)     Diabetes mellitus with diabetic neuropathy       Surgical History  Past Surgical History:   Procedure Laterality Date    APPENDECTOMY  2022    Appendectomy     SECTION, CLASSIC  2013     Section    CHOLECYSTECTOMY  2022    Cholecystectomy    GASTRIC BYPASS  2013    Gastric Surgery For Morbid Obesity Gastric Bypass    HYSTERECTOMY  2022    Hysterectomy    OOPHORECTOMY  2013    Oophorectomy    OTHER SURGICAL HISTORY  2022    Hernia repair    OTHER SURGICAL HISTORY  10/14/2022    Dilation and  curettage    OTHER SURGICAL HISTORY  10/14/2022    Laminectomy    OTHER SURGICAL HISTORY  10/14/2022    Spinal cord stimulation    OTHER SURGICAL HISTORY  10/14/2022    Liver biopsy    OTHER SURGICAL HISTORY  10/14/2022    Cardiac catheterization    OTHER SURGICAL HISTORY  2022    Mastectomy bilateral    OTHER SURGICAL HISTORY  2022     section    OTHER SURGICAL HISTORY  2022    Gastric bypass surgery    OTHER SURGICAL HISTORY  2021    Breast biopsy    OTHER SURGICAL HISTORY  2021    Lymphatic Surgery    OTHER SURGICAL HISTORY  2019    Hysterectomy    OTHER SURGICAL HISTORY  2019    Cholecystectomy    OTHER SURGICAL HISTORY  2019    Knee surgery        Social History  She reports that she has never smoked. She has never been exposed to tobacco smoke. She has never used smokeless tobacco. She reports that she does not drink alcohol and does not use drugs.    Family History  Family History   Problem Relation Name Age of Onset    Other (blood pressure) Mother          isolated elevated    Other (blood pressure) Father          isolated elevated    Diabetes Father      Other (cardiac problems) Father          reported previous    Diabetes Sister      Ovarian cancer Sister      Other (malignant carcinoma) Sister          of the breast    Diabetes Brother      Sleep apnea Brother          nonorganic    Bipolar disorder Son      Schizophrenia Son      Breast cancer Mother's Sister      Breast cancer Maternal Grandmother      Colon cancer Maternal Grandfather      Breast cancer Maternal Great-Grandmother          Allergies  Aspirin, Buprenorphine, Citalopram, Meperidine, Nsaids (non-steroidal anti-inflammatory drug), Penicillins, Prochlorperazine, Adhesive tape-silicones, Amitriptyline, Azithromycin, Methylprednisolone, Petroleum jelly [white petrolatum], and Tobramycin-dexamethasone    Review of Systems    Review of systems x 10 is negative.   No recent injury or  falls reported.   No recent change in medical condition reported.   No recent weakness reported.   Still able to control bowel and bladder function.  Denies any problem with constipation.   Denies fever, cough, shortness of breath recently.   No interval change with medication/health issues reported.  Denies opioids diversion and abuse. Denies overuse of pain medications.    Physical Exam   Awake,alert, no acute distress, appropriate.  Spine is of normal curvature.  Full ROM on all 4 extremities, sensation and motor intact, no vascular compromise.  No pedal edema, normal gait, using rollator with ambulation.  Skin warm, dry, intact, turgor is normal.  Denies any numbness, tingling.  Last Recorded Vitals  Blood pressure 144/56, pulse 85.    Relevant Results  No recent imaging noted.     Assessment/Plan     I have personally reviewed the OARRS report for this patient. I have considered the risk of abuse, dependence, addiction and diversion.  I believe that it is clinically appropriate for this patient  to be prescribed this medication based on documented diagnosis. She  believes usage of medication improves her quality of life and allows to participate day to day activity.  Continue Fentanyl patch, Gabapentin, Topiramate as prescribed.  Follow up in 3 months time or as needed basis  Explained plan to this patient, and patient verbalized understanding and agreement with the plan. If there is questions or concerns, please feel free to contact me to clarify at [phone], M-F 8-4 PM.  She has enough supply of Fentanyl patch, and Gabapentin, and Topamax at this time, will refill at a later date.     58 years old with history and physical examination supportive of CRPS requiring maintenance on opiate therapy . Chronic pain in lower extremities, mostly in the right, associated with CRPS type 1      I spent 35 minutes in the professional and overall care of this patient.      Lesa Thornton, APRN-CNP

## 2024-07-30 DIAGNOSIS — G90.50 RSD (REFLEX SYMPATHETIC DYSTROPHY): ICD-10-CM

## 2024-07-30 RX ORDER — TOPIRAMATE 50 MG/1
50 TABLET, FILM COATED ORAL 2 TIMES DAILY
Qty: 160 TABLET | Refills: 0 | Status: SHIPPED | OUTPATIENT
Start: 2024-07-30

## 2024-08-08 RX ORDER — FENTANYL 25 UG/1
1 PATCH TRANSDERMAL
Qty: 10 PATCH | Refills: 0 | Status: CANCELLED | OUTPATIENT
Start: 2024-08-08 | End: 2024-08-15

## 2024-08-09 ENCOUNTER — TELEPHONE (OUTPATIENT)
Dept: PAIN MEDICINE | Facility: CLINIC | Age: 59
End: 2024-08-09
Payer: COMMERCIAL

## 2024-08-09 NOTE — TELEPHONE ENCOUNTER
Letha Lopes Lynda called to see where we were in the prior auth process. Im not sure if you were able to find out from Vilma or Dr Tavares anything about the O.R.T. forms or D.A.S.T. forms.  That is what the insurance company states needs to be completed to approve her medication.  I found one I printed out that I will try to forward to you .  Thanks, Carlita.

## 2024-08-30 DIAGNOSIS — G90.50 RSD (REFLEX SYMPATHETIC DYSTROPHY): ICD-10-CM

## 2024-09-03 ENCOUNTER — APPOINTMENT (OUTPATIENT)
Dept: PAIN MEDICINE | Facility: CLINIC | Age: 59
End: 2024-09-03
Payer: COMMERCIAL

## 2024-09-03 VITALS
SYSTOLIC BLOOD PRESSURE: 137 MMHG | OXYGEN SATURATION: 97 % | TEMPERATURE: 96.1 F | DIASTOLIC BLOOD PRESSURE: 80 MMHG | HEART RATE: 71 BPM | RESPIRATION RATE: 18 BRPM

## 2024-09-03 DIAGNOSIS — G90.529 COMPLEX REGIONAL PAIN SYNDROME TYPE 1 OF LOWER EXTREMITY, UNSPECIFIED LATERALITY: ICD-10-CM

## 2024-09-03 DIAGNOSIS — G90.50 RSD (REFLEX SYMPATHETIC DYSTROPHY): ICD-10-CM

## 2024-09-03 DIAGNOSIS — G90.521 COMPLEX REGIONAL PAIN SYNDROME TYPE 1 OF RIGHT LOWER EXTREMITY: Primary | ICD-10-CM

## 2024-09-03 PROCEDURE — 99215 OFFICE O/P EST HI 40 MIN: CPT | Performed by: NURSE PRACTITIONER

## 2024-09-03 RX ORDER — GABAPENTIN 250 MG/5ML
15 SOLUTION ORAL 3 TIMES DAILY
Qty: 1350 ML | Refills: 2 | Status: SHIPPED | OUTPATIENT
Start: 2024-09-03 | End: 2024-09-05 | Stop reason: SDUPTHER

## 2024-09-03 RX ORDER — TOPIRAMATE 50 MG/1
TABLET, FILM COATED ORAL
Qty: 180 TABLET | Refills: 0 | OUTPATIENT
Start: 2024-09-03

## 2024-09-03 RX ORDER — TIRZEPATIDE 5 MG/.5ML
5 INJECTION, SOLUTION SUBCUTANEOUS WEEKLY
COMMUNITY
Start: 2024-08-27 | End: 2024-09-03 | Stop reason: ENTERED-IN-ERROR

## 2024-09-03 RX ORDER — BACLOFEN 10 MG/1
TABLET ORAL
Qty: 270 TABLET | Refills: 0 | OUTPATIENT
Start: 2024-09-03

## 2024-09-03 RX ORDER — BACLOFEN 10 MG/1
10 TABLET ORAL 4 TIMES DAILY
Qty: 120 TABLET | Refills: 11 | Status: SHIPPED | OUTPATIENT
Start: 2024-09-03

## 2024-09-03 RX ORDER — FENTANYL 25 UG/1
1 PATCH TRANSDERMAL
Qty: 10 PATCH | Refills: 0 | Status: SHIPPED | OUTPATIENT
Start: 2024-09-03

## 2024-09-03 ASSESSMENT — COLUMBIA-SUICIDE SEVERITY RATING SCALE - C-SSRS
6. HAVE YOU EVER DONE ANYTHING, STARTED TO DO ANYTHING, OR PREPARED TO DO ANYTHING TO END YOUR LIFE?: NO
2. HAVE YOU ACTUALLY HAD ANY THOUGHTS OF KILLING YOURSELF?: NO
1. IN THE PAST MONTH, HAVE YOU WISHED YOU WERE DEAD OR WISHED YOU COULD GO TO SLEEP AND NOT WAKE UP?: NO

## 2024-09-03 ASSESSMENT — PAIN SCALES - GENERAL
PAINLEVEL_OUTOF10: 7
PAINLEVEL: 7

## 2024-09-03 ASSESSMENT — PATIENT HEALTH QUESTIONNAIRE - PHQ9
SUM OF ALL RESPONSES TO PHQ9 QUESTIONS 1 AND 2: 0
2. FEELING DOWN, DEPRESSED OR HOPELESS: NOT AT ALL
1. LITTLE INTEREST OR PLEASURE IN DOING THINGS: NOT AT ALL

## 2024-09-03 NOTE — H&P
History Of Present Illness  Rut Tapia is a 58 y.o. female presenting for follow up, refill of medications. She is known in this clinic because of chronic pain right lower extremity. She is maintained on Fentanyl patch 25 mcgs every 72 hours, Gabapentin 250 mg TID, and Topiramate  50mg BID, and Baclofen 10 mg QID for pain, and muscle spasms. Her level of pain at this time is about 7/10 mostly in the right lower extremity.  OARRS obtained and reviewed, no abuse or misuse with prescribed medication noted.  Described positive response to the present medication therapy. Denies any side effects associated with the usage of the medication.   She is still able to perform activities of daily living with minimal help.     Past Medical History  Past Medical History:   Diagnosis Date    Arthritis     Chronic vascular disorders of intestine (Multi)     SMAS (superior mesenteric artery syndrome)    Narcolepsy without cataplexy ([facility])     Narcolepsy    Personal history of other diseases of the nervous system and sense organs     History of cataract    Personal history of other infectious and parasitic diseases     History of staph infection    Personal history of other mental and behavioral disorders     History of dementia    Personal history of other venous thrombosis and embolism     History of deep venous thrombosis    Personal history of urinary calculi     History of kidney stones    Type 2 diabetes mellitus with diabetic neuropathy, unspecified (Multi)     Diabetes mellitus with diabetic neuropathy       Surgical History  Past Surgical History:   Procedure Laterality Date    APPENDECTOMY  2022    Appendectomy     SECTION, CLASSIC  2013     Section    CHOLECYSTECTOMY  2022    Cholecystectomy    GASTRIC BYPASS  2013    Gastric Surgery For Morbid Obesity Gastric Bypass    HYSTERECTOMY  2022    Hysterectomy    OOPHORECTOMY  2013    Oophorectomy    OTHER SURGICAL  HISTORY  2022    Hernia repair    OTHER SURGICAL HISTORY  10/14/2022    Dilation and curettage    OTHER SURGICAL HISTORY  10/14/2022    Laminectomy    OTHER SURGICAL HISTORY  10/14/2022    Spinal cord stimulation    OTHER SURGICAL HISTORY  10/14/2022    Liver biopsy    OTHER SURGICAL HISTORY  10/14/2022    Cardiac catheterization    OTHER SURGICAL HISTORY  2022    Mastectomy bilateral    OTHER SURGICAL HISTORY  2022     section    OTHER SURGICAL HISTORY  2022    Gastric bypass surgery    OTHER SURGICAL HISTORY  2021    Breast biopsy    OTHER SURGICAL HISTORY  2021    Lymphatic Surgery    OTHER SURGICAL HISTORY  2019    Hysterectomy    OTHER SURGICAL HISTORY  2019    Cholecystectomy    OTHER SURGICAL HISTORY  2019    Knee surgery        Social History  She reports that she has never smoked. She has never been exposed to tobacco smoke. She has never used smokeless tobacco. She reports that she does not drink alcohol and does not use drugs.    Family History  Family History   Problem Relation Name Age of Onset    Other (blood pressure) Mother          isolated elevated    Other (blood pressure) Father          isolated elevated    Diabetes Father      Other (cardiac problems) Father          reported previous    Diabetes Sister      Ovarian cancer Sister      Other (malignant carcinoma) Sister          of the breast    Diabetes Brother      Sleep apnea Brother          nonorganic    Bipolar disorder Son      Schizophrenia Son      Breast cancer Mother's Sister      Breast cancer Maternal Grandmother      Colon cancer Maternal Grandfather      Breast cancer Maternal Great-Grandmother          Allergies  Aspirin, Buprenorphine, Citalopram, Meperidine, Nsaids (non-steroidal anti-inflammatory drug), Penicillins, Prochlorperazine, Adhesive tape-silicones, Amitriptyline, Azithromycin, Methylprednisolone, Petroleum jelly [white petrolatum], and  Tobramycin-dexamethasone    Review of Systems    Review of systems x 10 is negative.   No recent injury or falls reported.   No recent change in medical condition reported.   No recent weakness reported.   Still able to control bowel and bladder function.  Denies any problem with constipation.   Denies fever, cough, shortness of breath recently.   No interval change with medication/health issues reported.  Denies opioids diversion and abuse. Denies overuse of pain medications.    Physical Exam    Awake,alert, no acute distress, appropriate.  Spine is of normal curvature.  Full ROM on all 4 extremities, sensation and motor intact, no vascular compromise.  No pedal edema, normal gait, using rollator with ambulation.  Skin warm, dry, intact, turgor is normal.  She reports that she experiences a lot pain in her right foot.  Occasionally the right lower extremity goes numb.  Last Recorded Vitals  Blood pressure 137/80, pulse 71, temperature 35.6 °C (96.1 °F), temperature source Temporal, resp. rate 18, SpO2 97%.    Relevant Results  No recent imaging noted.     Assessment/Plan     I have personally reviewed the OARRS report for this patient. I have considered the risk of abuse, dependence, addiction and diversion.  I believe that it is clinically appropriate for this patient  to be prescribed this medication based on documented diagnosis.  Based on the favorable effect in your quality of life and the positive effect in the ability to do daily activities of daily living, I feel feel that the benefits outweighs the risk, and I will continue the current regimen of medications.  Continue Fentanyl patch 25 mcgs every 72 hours, Gabapentin 250 mg TID, Topiramate 50 mg BID, and Baclofen 10 mg QID.  Follow up in 3 months or as needed.  Explained plan to this patient, and patient verbalized understanding and agreement with the plan. If there is questions or concerns, please feel free to contact me to clarify at [phone], M-F  8-4 PM.  OPIOID RISK tOOL done today.    Chronic pain right lower extremity associated with CRPS type 1 requiring maintenance on opiate therapy.       I spent 40 minutes in the professional and overall care of this patient.      Lesa Thornton, AKHIL-CNP

## 2024-09-04 ENCOUNTER — TELEPHONE (OUTPATIENT)
Dept: PAIN MEDICINE | Facility: CLINIC | Age: 59
End: 2024-09-04
Payer: COMMERCIAL

## 2024-09-04 NOTE — TELEPHONE ENCOUNTER
Hi, Rut called in to say that her topirimate prescription was called in to [facility] and they did not fill because they dont do liquid, but she states she takes the pill.  She is requesting if you can please send in a new prescription for the topirimate in pill form.  Also, her recent prescriptions for gabapentin and fentanyl went to [facility] and she requested they cancel that because she needs them sent to her local [facility] in [facility].  She is requesting if you could send  a new script for the gabapentin and the fentanyl prescription to [facility] please

## 2024-09-05 DIAGNOSIS — G90.50 RSD (REFLEX SYMPATHETIC DYSTROPHY): Primary | ICD-10-CM

## 2024-09-05 DIAGNOSIS — G90.521 COMPLEX REGIONAL PAIN SYNDROME TYPE 1 OF RIGHT LOWER EXTREMITY: ICD-10-CM

## 2024-09-05 DIAGNOSIS — G90.529 COMPLEX REGIONAL PAIN SYNDROME TYPE 1 OF LOWER EXTREMITY, UNSPECIFIED LATERALITY: ICD-10-CM

## 2024-09-05 DIAGNOSIS — M62.838 MUSCLE SPASM OF RIGHT LOWER EXTREMITY: Primary | ICD-10-CM

## 2024-09-05 RX ORDER — GABAPENTIN 250 MG/5ML
15 SOLUTION ORAL 3 TIMES DAILY
Qty: 1350 ML | Refills: 2 | Status: SHIPPED | OUTPATIENT
Start: 2024-09-05

## 2024-09-05 RX ORDER — TOPIRAMATE 50 MG/1
50 TABLET, FILM COATED ORAL 2 TIMES DAILY
Qty: 60 TABLET | Refills: 2 | Status: SHIPPED | OUTPATIENT
Start: 2024-09-05 | End: 2024-12-04

## 2024-09-05 RX ORDER — FENTANYL 25 UG/1
1 PATCH TRANSDERMAL
Qty: 10 PATCH | Refills: 0 | Status: SHIPPED | OUTPATIENT
Start: 2024-09-05

## 2024-09-05 RX ORDER — BACLOFEN 10 MG/1
10 TABLET ORAL 4 TIMES DAILY PRN
Qty: 120 TABLET | Refills: 2 | Status: SHIPPED | OUTPATIENT
Start: 2024-09-05 | End: 2024-12-04

## 2024-09-05 NOTE — TELEPHONE ENCOUNTER
I ask Dr. PERDOMO to rx them with specific instructions since I was having trouble with my   [place] license last Tuesday. But I will resolve today.

## 2024-10-08 DIAGNOSIS — G90.529 COMPLEX REGIONAL PAIN SYNDROME TYPE 1 OF LOWER EXTREMITY, UNSPECIFIED LATERALITY: Primary | ICD-10-CM

## 2024-10-08 RX ORDER — FENTANYL 25 UG/1
1 PATCH TRANSDERMAL
Qty: 10 PATCH | Refills: 0 | Status: SHIPPED | OUTPATIENT
Start: 2024-11-07 | End: 2024-12-07

## 2024-10-08 RX ORDER — FENTANYL 25 UG/1
1 PATCH TRANSDERMAL
Qty: 10 PATCH | Refills: 0 | Status: SHIPPED | OUTPATIENT
Start: 2024-10-08 | End: 2024-11-07

## 2024-12-02 ENCOUNTER — OFFICE VISIT (OUTPATIENT)
Dept: PAIN MEDICINE | Facility: CLINIC | Age: 59
End: 2024-12-02
Payer: COMMERCIAL

## 2024-12-02 VITALS
DIASTOLIC BLOOD PRESSURE: 60 MMHG | SYSTOLIC BLOOD PRESSURE: 134 MMHG | HEART RATE: 76 BPM | OXYGEN SATURATION: 96 % | RESPIRATION RATE: 18 BRPM | TEMPERATURE: 97.1 F

## 2024-12-02 DIAGNOSIS — G90.50 RSD (REFLEX SYMPATHETIC DYSTROPHY): ICD-10-CM

## 2024-12-02 DIAGNOSIS — G90.521 COMPLEX REGIONAL PAIN SYNDROME TYPE 1 OF RIGHT LOWER EXTREMITY: Primary | ICD-10-CM

## 2024-12-02 PROCEDURE — 99215 OFFICE O/P EST HI 40 MIN: CPT | Performed by: NURSE PRACTITIONER

## 2024-12-02 RX ORDER — FENTANYL 25 UG/1
1 PATCH TRANSDERMAL
Qty: 10 PATCH | Refills: 0 | Status: SHIPPED | OUTPATIENT
Start: 2025-01-17 | End: 2025-02-16

## 2024-12-02 RX ORDER — TIRZEPATIDE 7.5 MG/.5ML
7.5 INJECTION, SOLUTION SUBCUTANEOUS WEEKLY
COMMUNITY
Start: 2024-09-25 | End: 2024-12-02

## 2024-12-02 RX ORDER — FENTANYL 25 UG/1
1 PATCH TRANSDERMAL
Qty: 10 PATCH | Refills: 0 | Status: SHIPPED | OUTPATIENT
Start: 2024-12-18 | End: 2025-01-17

## 2024-12-02 RX ORDER — GABAPENTIN 250 MG/5ML
15 SOLUTION ORAL 3 TIMES DAILY
Qty: 1350 ML | Refills: 2 | Status: SHIPPED | OUTPATIENT
Start: 2024-12-02

## 2024-12-02 RX ORDER — FENTANYL 25 UG/1
1 PATCH TRANSDERMAL
Qty: 10 PATCH | Refills: 0 | Status: SHIPPED | OUTPATIENT
Start: 2025-02-16 | End: 2025-03-18

## 2024-12-02 ASSESSMENT — PAIN SCALES - GENERAL: PAINLEVEL_OUTOF10: 4

## 2024-12-02 ASSESSMENT — PAIN - FUNCTIONAL ASSESSMENT: PAIN_FUNCTIONAL_ASSESSMENT: 0-10

## 2024-12-02 ASSESSMENT — PAIN DESCRIPTION - DESCRIPTORS: DESCRIPTORS: ACHING;BURNING;SHARP

## 2024-12-02 NOTE — H&P
History Of Present Illness  Rut Tapia is a 58 y.o. female presenting for refill of medications.  She is known in this clinic because of chronic pain right lower extremity  She is maintained on Fentanyl patch 25 mcgs every 72 hours, Gabapentin 250 mg TID, and Topiramate 50mg BID, and Baclofen 10 mg QID for pain, and muscle spasms. Her level of pain at this time is about 4/10 mostly in the right lower extremity, described as constant sharp, burning, aching type of discomfort.   OARRS obtained and reviewed, no abuse or misuse with prescribed medication noted.  She described positive response to the present medication therapy. Denies any side effects associated with the usage of the medication.   She is still able to perform activities of daily living with minimal help.  Past Medical History  Past Medical History:   Diagnosis Date    Arthritis     Chronic vascular disorders of intestine (Multi)     SMAS (superior mesenteric artery syndrome)    Narcolepsy without cataplexy ([facility])     Narcolepsy    Personal history of other diseases of the nervous system and sense organs     History of cataract    Personal history of other infectious and parasitic diseases     History of staph infection    Personal history of other mental and behavioral disorders     History of dementia    Personal history of other venous thrombosis and embolism     History of deep venous thrombosis    Personal history of urinary calculi     History of kidney stones    Type 2 diabetes mellitus with diabetic neuropathy, unspecified (Multi)     Diabetes mellitus with diabetic neuropathy       Surgical History  Past Surgical History:   Procedure Laterality Date    APPENDECTOMY  2022    Appendectomy     SECTION, CLASSIC  2013     Section    CHOLECYSTECTOMY  2022    Cholecystectomy    GASTRIC BYPASS  2013    Gastric Surgery For Morbid Obesity Gastric Bypass    HYSTERECTOMY  2022    Hysterectomy     OOPHORECTOMY  2013    Oophorectomy    OTHER SURGICAL HISTORY  2022    Hernia repair    OTHER SURGICAL HISTORY  10/14/2022    Dilation and curettage    OTHER SURGICAL HISTORY  10/14/2022    Laminectomy    OTHER SURGICAL HISTORY  10/14/2022    Spinal cord stimulation    OTHER SURGICAL HISTORY  10/14/2022    Liver biopsy    OTHER SURGICAL HISTORY  10/14/2022    Cardiac catheterization    OTHER SURGICAL HISTORY  2022    Mastectomy bilateral    OTHER SURGICAL HISTORY  2022     section    OTHER SURGICAL HISTORY  2022    Gastric bypass surgery    OTHER SURGICAL HISTORY  2021    Breast biopsy    OTHER SURGICAL HISTORY  2021    Lymphatic Surgery    OTHER SURGICAL HISTORY  2019    Hysterectomy    OTHER SURGICAL HISTORY  2019    Cholecystectomy    OTHER SURGICAL HISTORY  2019    Knee surgery        Social History  She reports that she has never smoked. She has never been exposed to tobacco smoke. She has never used smokeless tobacco. She reports that she does not drink alcohol and does not use drugs.    Family History  Family History   Problem Relation Name Age of Onset    Other (blood pressure) Mother          isolated elevated    Other (blood pressure) Father          isolated elevated    Diabetes Father      Other (cardiac problems) Father          reported previous    Diabetes Sister      Ovarian cancer Sister      Other (malignant carcinoma) Sister          of the breast    Diabetes Brother      Sleep apnea Brother          nonorganic    Bipolar disorder Son      Schizophrenia Son      Breast cancer Mother's Sister      Breast cancer Maternal Grandmother      Colon cancer Maternal Grandfather      Breast cancer Maternal Great-Grandmother          Allergies  Aspirin, Buprenorphine, Citalopram, Meperidine, Nsaids (non-steroidal anti-inflammatory drug), Penicillins, Prochlorperazine, Adhesive tape-silicones, Amitriptyline, Azithromycin, Methylprednisolone,  Petroleum jelly [white petrolatum], and Tobramycin-dexamethasone    Review of Systems   Review of systems x 10 is negative.   No recent injury or falls reported.   No recent change in medical condition reported.   No recent weakness reported.   Still able to control bowel and bladder function.  Denies any problem with constipation.   Denies fever, cough, shortness of breath recently.   No interval change with medication/health issues reported.  Denies opioids diversion and abuse. Denies overuse of pain medications.     Physical Exam    Awake,alert, no acute distress, appropriate.  Spine is of normal curvature.  Full ROM on all 4 extremities, sensation and motor intact, no vascular compromise.  No pedal edema, normal gait, using rollator with ambulation.  Skin warm, dry, intact, turgor is normal.   Experiences a lot of pain in right foot, reports of numbness right lower extremity.  Last Recorded Vitals  Blood pressure 134/60, pulse 76, temperature 36.2 °C (97.1 °F), temperature source Temporal, resp. rate 18, SpO2 96%.    Relevant Results  No recent imaging noted.       Assessment/Plan     I have personally reviewed the OARRS report for this patient. I have considered the risk of abuse, dependence, addiction and diversion.  I believe that it is clinically appropriate for this patient  to be prescribed this medication based on documented diagnosis.  Based on the favorable effect in your quality of life and the positive effect in the ability to do daily activities of daily living, I feel feel that the benefits outweighs the risk, and I will continue the current regimen of medications.  Continue Fentanyl patch 25 mcgs every 72 hours, Gabapentin 250 mg TID, Topiramate 50 mg BID, and Baclofen 10 mg QID.  Follow up in 3 months or as needed.  Explained plan to this patient, and patient verbalized understanding and agreement with the plan. If there is questions or concerns, please feel free to contact me to clarify at  [phone], M-F 8-4 PM.    Chronic pain right lower extremity associated with RSD right lower limb  Type 1 requiring maintenance on opiate therapy.       I spent 40 minutes in the professional and overall care of this patient.      Lesa Thornton, AKHIL-CNP

## 2025-01-08 RX ORDER — BACLOFEN 10 MG/1
TABLET ORAL
Qty: 120 TABLET | Refills: 11 | OUTPATIENT
Start: 2025-01-08

## 2025-01-08 RX ORDER — TOPIRAMATE 50 MG/1
50 TABLET, FILM COATED ORAL 2 TIMES DAILY
Qty: 180 TABLET | Refills: 3 | OUTPATIENT
Start: 2025-01-08

## 2025-02-28 ENCOUNTER — OFFICE VISIT (OUTPATIENT)
Dept: PAIN MEDICINE | Facility: CLINIC | Age: 60
End: 2025-02-28
Payer: COMMERCIAL

## 2025-02-28 DIAGNOSIS — G90.50 RSD (REFLEX SYMPATHETIC DYSTROPHY): Primary | ICD-10-CM

## 2025-02-28 PROCEDURE — 99213 OFFICE O/P EST LOW 20 MIN: CPT | Performed by: PAIN MEDICINE

## 2025-02-28 RX ORDER — TIRZEPATIDE 10 MG/.5ML
10 INJECTION, SOLUTION SUBCUTANEOUS
COMMUNITY

## 2025-02-28 ASSESSMENT — PAIN - FUNCTIONAL ASSESSMENT: PAIN_FUNCTIONAL_ASSESSMENT: 0-10

## 2025-02-28 ASSESSMENT — PAIN SCALES - GENERAL: PAINLEVEL_OUTOF10: 3

## 2025-02-28 NOTE — H&P
History Of Present Illness  Rut Tapia is a 59 y.o. female presenting with RSD and chronic pain   Patient is here today for a routine follow-up visit and medication refill confirming that the pain is very well under control with the usage of the current regimen of the medication atenolol 25 mcg/h every 72 hours and gabapentin 250 mg per 5 mL taking it 3-4 times per day there has been no signs of misuse or abuse of any prescription noted upon the review of the [place] automated reporting prescription.  I have personally reviewed the [place] Automated Prescription Report for this patient I have considered the risk of abuse, misuse, dependence, divergent and addiction.The patient denies any side effects associated with the usage of the medication patient described the medication improving the quality of life and allowing participation in day-to-day activity patient denies otherwise any new complaint patient is requesting a refill. Patient denies usage of any other opiate. Patient denies usage of any recreational drugs. Patient confirms that the opiate prescription is being used as prescribed.  Rating pain 3 .  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[facility])     Narcolepsy    Personal history of other diseases of the nervous system and sense organs     History of cataract    Personal history of other infectious and parasitic diseases     History of staph infection    Personal history of other mental and behavioral disorders     History of dementia    Personal history of other venous thrombosis and embolism     History of deep venous thrombosis    Personal history of urinary calculi     History of kidney stones    Type 2 diabetes mellitus with diabetic neuropathy, unspecified (Multi)      Diabetes mellitus with diabetic neuropathy     Surgical History  Past Surgical History:   Procedure Laterality Date    APPENDECTOMY  2022    Appendectomy     SECTION, CLASSIC  2013     Section    CHOLECYSTECTOMY  2022    Cholecystectomy    GASTRIC BYPASS  2013    Gastric Surgery For Morbid Obesity Gastric Bypass    HYSTERECTOMY  2022    Hysterectomy    OOPHORECTOMY  2013    Oophorectomy    OTHER SURGICAL HISTORY  2022    Hernia repair    OTHER SURGICAL HISTORY  10/14/2022    Dilation and curettage    OTHER SURGICAL HISTORY  10/14/2022    Laminectomy    OTHER SURGICAL HISTORY  10/14/2022    Spinal cord stimulation    OTHER SURGICAL HISTORY  10/14/2022    Liver biopsy    OTHER SURGICAL HISTORY  10/14/2022    Cardiac catheterization    OTHER SURGICAL HISTORY  2022    Mastectomy bilateral    OTHER SURGICAL HISTORY  2022     section    OTHER SURGICAL HISTORY  2022    Gastric bypass surgery    OTHER SURGICAL HISTORY  2021    Breast biopsy    OTHER SURGICAL HISTORY  2021    Lymphatic Surgery    OTHER SURGICAL HISTORY  2019    Hysterectomy    OTHER SURGICAL HISTORY  2019    Cholecystectomy    OTHER SURGICAL HISTORY  2019    Knee surgery     Social History  She reports that she has never smoked. She has never been exposed to tobacco smoke. She has never used smokeless tobacco. She reports that she does not drink alcohol and does not use drugs.    Family History  Family History   Problem Relation Name Age of Onset    Other (blood pressure) Mother          isolated elevated    Other (blood pressure) Father          isolated elevated    Diabetes Father      Other (cardiac problems) Father          reported previous    Diabetes Sister      Ovarian cancer Sister      Other (malignant carcinoma) Sister          of the breast    Diabetes Brother      Sleep apnea Brother          nonorganic    Bipolar disorder Son       Schizophrenia Son      Breast cancer Mother's Sister      Breast cancer Maternal Grandmother      Colon cancer Maternal Grandfather      Breast cancer Maternal Great-Grandmother          Allergies  Allergies   Allergen Reactions    Aspirin Anaphylaxis    Buprenorphine Hives    Citalopram Other     Decrease heart rate.    Meperidine Hives and Diarrhea    Nsaids (Non-Steroidal Anti-Inflammatory Drug) Other     Post gastric bypass patient    Penicillins Anaphylaxis    Prochlorperazine Other     Altered mental status    Adhesive Tape-Silicones Hives    Amitriptyline Other      Severe Mental/Narcolepsy episodes.    Azithromycin Hives and Swelling     Only to Z-pack.    Methylprednisolone Hives     Only to Medrol dose pack.    Petroleum Jelly [White Petrolatum] Hives    Tobramycin-Dexamethasone Hives     Review of Systems   All 13 systems were reviewed and are within normal levels except as noted below or per HPI. Positive and pertinent negative responses are noted below or in the HPI   Denied any fever or chills. No weight loss and no night sweats. No cough or sputum production. No diarrhea   No constipation  No bladder and bowel incontinence and no other changes in bladder and bowel. No skin changes.   Denied opioids diversion and abuse and denies alcoholism. Denies overuse of  pain medications.    Physical Exam       Past medical history no interval changes has been noted    On physical examination    General   Alert, oriented x3 pleasant and cooperative. Does not look in any major distress.    HEENT  Pupils normal in size. Ears, nose, mouth, and throat appear to be in normal condition.  Head atraumatic      No signs of sedation or signs of withdrawal apparent.    Psychiatric   No signs of depression apparent.    Neuro   No focal neurological deficit apparent. Ambulation at baseline.      Respiratory  No respiratory distress     Abdomen  no distention     Skin  No skin markings supportive of recent IV drug usage  .    Cardiovascular  Regular rate and rhythm     Last Recorded Vitals  There were no vitals taken for this visit.    Assessment/Plan   59 years old with history and physical examination supportive of CRPS requiring maintenance on opiate therapy   Plan  I believe the benefits of the continuation of the opiate outweighs the risk. Patient has described positive response to the opiate therapy. Patient denies any side effects associated with the usage of the opiate. Patient did not elicit any signs supportive of any opioid misuse or abuse. The review of the [place] automated reporting prescription is not suggestive of any worrisome pattern. Patient believes the usage of the opioid as improve the quality of life and allow the patient to continue to participate in day-to-day activity. Therefore I would recommend with the continuation of the opiate therapy and I will be refilling the patient prescription.  The fentanyl and gabapentin on the due date of March 22, 2025   I advised the patient to always take the least needed of the medication to keep the  pain under control. I encouraged the patient to keep a pain dairy so that during subsequent visit we could look at the trend of the  response to the pain medication and titrate the medication accordingly. Patient verbalized understanding and agreement with the plan and will be following-up with  the pain clinic within 3 months duration, or if needed any sooner.         The above clinical summary has been dictated with voice recognition software. It has not been proofread for grammatical errors, typographical mistakes, or other semantic inconsistencies.    Thank you for visiting our office today. It was our pleasure to take part in your healthcare.     Please do not hesitate to contact the pain clinic after your visit with any questions or concerns at  M-F 8-4 pm       Duc Tavares M.D.  Medical Director , Division of Pain Medicine [facility]    [facility]   of Anesthesiology and Pain Medicine  [facility] [employer] [facility]     [facility]  [id number] [place] [address]  [address]  [place] [address]     Office: [phone]  Fax: [phone]   Duc Tavares MD

## 2025-03-21 ENCOUNTER — TELEPHONE (OUTPATIENT)
Dept: PAIN MEDICINE | Facility: CLINIC | Age: 60
End: 2025-03-21
Payer: COMMERCIAL

## 2025-03-21 DIAGNOSIS — G90.529 COMPLEX REGIONAL PAIN SYNDROME TYPE 1 OF LOWER EXTREMITY, UNSPECIFIED LATERALITY: ICD-10-CM

## 2025-03-21 DIAGNOSIS — G90.50 RSD (REFLEX SYMPATHETIC DYSTROPHY): ICD-10-CM

## 2025-03-21 RX ORDER — BACLOFEN 10 MG/1
10 TABLET ORAL 4 TIMES DAILY
Qty: 120 TABLET | Refills: 11 | Status: SHIPPED | OUTPATIENT
Start: 2025-03-21

## 2025-03-21 RX ORDER — FENTANYL 25 UG/1
1 PATCH TRANSDERMAL
Qty: 10 PATCH | Refills: 0 | Status: SHIPPED | OUTPATIENT
Start: 2025-04-20

## 2025-03-21 RX ORDER — FENTANYL 25 UG/1
1 PATCH TRANSDERMAL
Qty: 10 PATCH | Refills: 0 | Status: SHIPPED | OUTPATIENT
Start: 2025-03-21

## 2025-03-21 NOTE — TELEPHONE ENCOUNTER
Patient called in for refills of her fentanyl patch and baclofen. Patient uses [facility] in [place]. She was last seen in the office on 2/28/25.

## 2025-04-21 DIAGNOSIS — G90.521 COMPLEX REGIONAL PAIN SYNDROME TYPE 1 OF RIGHT LOWER EXTREMITY: ICD-10-CM

## 2025-04-21 DIAGNOSIS — G90.50 RSD (REFLEX SYMPATHETIC DYSTROPHY): ICD-10-CM

## 2025-04-21 RX ORDER — GABAPENTIN 250 MG/5ML
SOLUTION ORAL
Qty: 1350 ML | Refills: 3 | Status: SHIPPED | OUTPATIENT
Start: 2025-04-21 | End: 2025-04-22 | Stop reason: SDUPTHER

## 2025-04-21 NOTE — TELEPHONE ENCOUNTER
Pt is requesting a refill of gabapentin sent in to [facility] please.

## 2025-04-22 RX ORDER — GABAPENTIN 250 MG/5ML
250 SOLUTION ORAL 3 TIMES DAILY
Qty: 450 ML | Refills: 0 | Status: SHIPPED | OUTPATIENT
Start: 2025-04-22 | End: 2025-05-22

## 2025-05-23 ENCOUNTER — APPOINTMENT (OUTPATIENT)
Dept: PAIN MEDICINE | Facility: CLINIC | Age: 60
End: 2025-05-23
Payer: COMMERCIAL

## 2025-05-23 DIAGNOSIS — G90.50 RSD (REFLEX SYMPATHETIC DYSTROPHY): ICD-10-CM

## 2025-05-23 DIAGNOSIS — G90.521 COMPLEX REGIONAL PAIN SYNDROME TYPE 1 OF RIGHT LOWER EXTREMITY: ICD-10-CM

## 2025-05-23 DIAGNOSIS — G90.529 COMPLEX REGIONAL PAIN SYNDROME TYPE 1 OF LOWER EXTREMITY, UNSPECIFIED LATERALITY: ICD-10-CM

## 2025-05-23 PROCEDURE — 99214 OFFICE O/P EST MOD 30 MIN: CPT | Performed by: PAIN MEDICINE

## 2025-05-23 RX ORDER — FENTANYL 25 UG/1
1 PATCH TRANSDERMAL
Qty: 10 PATCH | Refills: 0 | Status: SHIPPED | OUTPATIENT
Start: 2025-05-23

## 2025-05-23 RX ORDER — GABAPENTIN 250 MG/5ML
750 SOLUTION ORAL 3 TIMES DAILY
Qty: 1350 ML | Refills: 3 | Status: SHIPPED | OUTPATIENT
Start: 2025-05-23

## 2025-05-23 RX ORDER — FENTANYL 25 UG/1
1 PATCH TRANSDERMAL
Qty: 10 PATCH | Refills: 0 | Status: SHIPPED | OUTPATIENT
Start: 2025-06-22

## 2025-05-23 RX ORDER — FENTANYL 25 UG/1
1 PATCH TRANSDERMAL
Qty: 10 PATCH | Refills: 0 | Status: SHIPPED | OUTPATIENT
Start: 2025-07-22

## 2025-05-23 ASSESSMENT — PAIN - FUNCTIONAL ASSESSMENT: PAIN_FUNCTIONAL_ASSESSMENT: 0-10

## 2025-05-23 ASSESSMENT — COLUMBIA-SUICIDE SEVERITY RATING SCALE - C-SSRS
6. HAVE YOU EVER DONE ANYTHING, STARTED TO DO ANYTHING, OR PREPARED TO DO ANYTHING TO END YOUR LIFE?: NO
1. IN THE PAST MONTH, HAVE YOU WISHED YOU WERE DEAD OR WISHED YOU COULD GO TO SLEEP AND NOT WAKE UP?: NO
2. HAVE YOU ACTUALLY HAD ANY THOUGHTS OF KILLING YOURSELF?: NO

## 2025-05-23 ASSESSMENT — PAIN SCALES - GENERAL: PAINLEVEL_OUTOF10: 4

## 2025-05-23 ASSESSMENT — PAIN DESCRIPTION - DESCRIPTORS: DESCRIPTORS: BURNING;OTHER (COMMENT)

## 2025-05-23 NOTE — H&P
History Of Present Illness  Rut Tapia is a 59 y.o. female presenting with chronic pain with crps of lower extremity currently on Fentanyl patches 25 mcg/h every 72 hours and gabapentin.Patient is here today for a routine follow-up visit and medication refill confirming that the pain is very well under control with the usage of the current regimen of the medication there has been no signs of misuse or abuse of any prescription noted upon the review of the [place] automated reporting prescription.  I have personally reviewed the [place] Automated Prescription Report for this patient I have considered the risk of abuse, misuse, dependence, divergent and addiction.The patient denies any side effects associated with the usage of the medication patient described the medication improving the quality of life and allowing participation in day-to-day activity patient denies otherwise any new complaint patient is requesting a refill. Patient denies usage of any other opiate. Patient denies usage of any recreational drugs. Patient confirms that the opiate prescription is being used as prescribed.  Rating pain at 75% rating pain at 4      Past Medical History  Medical History[1]  Surgical History  Surgical History[2]  Social History  She reports that she has never smoked. She has never been exposed to tobacco smoke. She has never used smokeless tobacco. She reports that she does not drink alcohol and does not use drugs.    Family History  Family History[3]     Allergies  Allergies[4]  Review of Systems   All 13 systems were reviewed and are within normal levels except as noted below or per HPI. Positive and pertinent negative responses are noted below or in the HPI   Denied any fever or chills. No weight loss and no night sweats. No cough or sputum production. No diarrhea   No constipation  No bladder and bowel incontinence and no other changes in bladder and bowel. No skin changes.   Denied opioids diversion and abuse and  denies alcoholism. Denies overuse of  pain medications.    Physical Exam       Past medical history no interval changes has been noted    On physical examination    General   Alert, oriented x3 pleasant and cooperative. Does not look in any major distress.    HEENT  Pupils normal in size. Ears, nose, mouth, and throat appear to be in normal condition.  Head atraumatic      No signs of sedation or signs of withdrawal apparent.    Psychiatric   No signs of depression apparent.    Neuro   No focal neurological deficit apparent. Ambulation with walker       Respiratory  No respiratory distress     Abdomen  no distention     Skin  No skin markings supportive of recent IV drug usage .    Cardiovascular  Regular rate and rhythm    Last Recorded Vitals  There were no vitals taken for this visit.    Assessment/Plan   59 years old with history and physical examination supportive of CRPS requiring maintenance on opiate therapy   Plan  I believe the benefits of the continuation of the opiate outweighs the risk.  Have considered the risks of abuse, dependence, addiction and diversion . Patient has described positive response to the opiate therapy. Patient denies any side effects associated with the usage of the opiate. Patient did not elicit any signs supportive of any opioid misuse or abuse. The review of the [place] automated reporting prescription is not suggestive of any worrisome pattern. Patient believes the usage of the opioid as improve the quality of life and allow the patient to continue to participate in day-to-day activity. Therefore I would recommend with the continuation of the opiate therapy and I will be refilling the patient prescription.    I advised the patient to always take the least needed of the medication to keep the  pain under control. I encouraged the patient to keep a pain dairy so that during subsequent visit we could look at the trend of the  response to the pain medication and titrate the medication  accordingly. Patient verbalized understanding and agreement with the plan and will be following-up with  the pain clinic within 3 months duration, or if needed any sooner.  Patient is being treated with opioid therapy for pain and is responding appropriately.  There are NO signs of opioid intoxication, abuse, addiction or withdrawal.  Pupils are equal, reactive to light bilateral, appropriate speech and cognition. Patient denies any opioid induced constipation. The [place] registry followed periodically, urine toxicology completed and appropriate.     Patient is advised and warned  in specific detail about potential benefits of opioids along with risks and side effects including, but not limited to, dependency, addiction, tolerance, hyperalgesia, anxiety, depression, insomnia, endocrine changes, immunologic disturbances, respiratory depression and death.     Caution advised regarding the use of medications prescribed at this office and specific mention made regarding not to drive or operate heavy machinery if feeling side effects from this the medications. Patient  expressed an understanding in regards to these particular concerns.     Discussed non-opioid options including (But no limited), physical wellness, antiinflammatory diet, icing and heating, meditation, relaxation, massage and acupuncture.    We will continue to monitor and adjust medications as needed.    Patient did renew her contract for the opiate treatment today and old prescription of fentanyl 25 mcg every 72 hours patches gabapentin 15 mL of the 250 mg/mL 3 times daily were refilled for quantity sufficient for 3-month and she will be followed up in the pain clinic within 3 months or if needed any sooner      The above clinical summary has been dictated with voice recognition software. It has not been proofread for grammatical errors, typographical mistakes, or other semantic inconsistencies.    Thank you for visiting our office today. It was our  pleasure to take part in your healthcare.     Please do not hesitate to contact the pain clinic after your visit with any questions or concerns at  M-F 8-4 pm       Duc Tavares M.D.  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[facility])     Narcolepsy    Personal history of other diseases of the nervous system and sense organs     History of cataract    Personal history of other infectious and parasitic diseases     History of staph infection    Personal history of other mental and behavioral disorders     History of dementia    Personal history of other venous thrombosis and embolism     History of deep venous thrombosis    Personal history of urinary calculi     History of kidney stones    Type 2 diabetes mellitus with diabetic neuropathy, unspecified (Multi)     Diabetes mellitus with diabetic neuropathy   [2]   Past Surgical History:  Procedure Laterality Date    APPENDECTOMY  2022    Appendectomy     SECTION, CLASSIC  2013     Section    CHOLECYSTECTOMY  2022    Cholecystectomy    GASTRIC BYPASS  2013    Gastric Surgery For Morbid Obesity Gastric Bypass    HYSTERECTOMY  2022    Hysterectomy    OOPHORECTOMY  2013    Oophorectomy    OTHER SURGICAL HISTORY  2022    Hernia repair    OTHER SURGICAL HISTORY  10/14/2022    Dilation and curettage    OTHER SURGICAL HISTORY  10/14/2022     Laminectomy    OTHER SURGICAL HISTORY  10/14/2022    Spinal cord stimulation    OTHER SURGICAL HISTORY  10/14/2022    Liver biopsy    OTHER SURGICAL HISTORY  10/14/2022    Cardiac catheterization    OTHER SURGICAL HISTORY  2022    Mastectomy bilateral    OTHER SURGICAL HISTORY  2022     section    OTHER SURGICAL HISTORY  2022    Gastric bypass surgery    OTHER SURGICAL HISTORY  2021    Breast biopsy    OTHER SURGICAL HISTORY  2021    Lymphatic Surgery    OTHER SURGICAL HISTORY  2019    Hysterectomy    OTHER SURGICAL HISTORY  2019    Cholecystectomy    OTHER SURGICAL HISTORY  2019    Knee surgery   [3]   Family History  Problem Relation Name Age of Onset    Other (blood pressure) Mother          isolated elevated    Other (blood pressure) Father          isolated elevated    Diabetes Father      Other (cardiac problems) Father          reported previous    Diabetes Sister      Ovarian cancer Sister      Other (malignant carcinoma) Sister          of the breast    Diabetes Brother      Sleep apnea Brother          nonorganic    Bipolar disorder Son      Schizophrenia Son      Breast cancer Mother's Sister      Breast cancer Maternal Grandmother      Colon cancer Maternal Grandfather      Breast cancer Maternal Great-Grandmother     [4]   Allergies  Allergen Reactions    Aspirin Anaphylaxis    Buprenorphine Hives    Citalopram Other     Decrease heart rate.    Meperidine Hives and Diarrhea    Nsaids (Non-Steroidal Anti-Inflammatory Drug) Other     Post gastric bypass patient    Penicillins Anaphylaxis    Prochlorperazine Other     Altered mental status    Adhesive Tape-Silicones Hives    Amitriptyline Other      Severe Mental/Narcolepsy episodes.    Azithromycin Hives and Swelling     Only to Z-pack.    Methylprednisolone Hives     Only to Medrol dose pack.    Petroleum Jelly [White Petrolatum] Hives    Tobramycin-Dexamethasone Hives

## 2025-06-03 ENCOUNTER — TELEPHONE (OUTPATIENT)
Dept: PAIN MEDICINE | Facility: CLINIC | Age: 60
End: 2025-06-03
Payer: MEDICARE

## 2025-06-03 NOTE — TELEPHONE ENCOUNTER
Patient called and left message. States she saw her neurologist today, Dr. Vu. He would like her to try topiramate 75 mg in the am and 50 mg in the pm for recent cluster headaches. The neurologist wanted the ok from our office to try this instead of prescribing a new medication. Please advise

## 2025-07-03 ENCOUNTER — TELEPHONE (OUTPATIENT)
Dept: GENETICS | Facility: CLINIC | Age: 60
End: 2025-07-03
Payer: MEDICARE

## 2025-07-03 NOTE — TELEPHONE ENCOUNTER
Called patient regarding her past genetic testing and visits with CELESTE Barrios. The patient provided verbal consent to discuss her past genetic counseling and genetic test results with her brother, Homer Fuller, for the purposes of his genetic counseling appointment.

## 2025-08-22 ENCOUNTER — OFFICE VISIT (OUTPATIENT)
Dept: PAIN MEDICINE | Facility: CLINIC | Age: 60
End: 2025-08-22
Payer: COMMERCIAL

## 2025-08-22 DIAGNOSIS — G90.521 COMPLEX REGIONAL PAIN SYNDROME TYPE 1 OF RIGHT LOWER EXTREMITY: ICD-10-CM

## 2025-08-22 DIAGNOSIS — Z79.891 LONG-TERM CURRENT USE OF OPIATE ANALGESIC: Primary | ICD-10-CM

## 2025-08-22 DIAGNOSIS — G90.50 RSD (REFLEX SYMPATHETIC DYSTROPHY): ICD-10-CM

## 2025-08-22 DIAGNOSIS — G90.529 COMPLEX REGIONAL PAIN SYNDROME TYPE 1 OF LOWER EXTREMITY, UNSPECIFIED LATERALITY: ICD-10-CM

## 2025-08-22 PROCEDURE — 99212 OFFICE O/P EST SF 10 MIN: CPT

## 2025-08-22 RX ORDER — FENTANYL 25 UG/1
1 PATCH TRANSDERMAL
Qty: 10 PATCH | Refills: 0 | Status: SHIPPED | OUTPATIENT
Start: 2025-09-21

## 2025-08-22 RX ORDER — GABAPENTIN 250 MG/5ML
750 SOLUTION ORAL 3 TIMES DAILY
Qty: 1350 ML | Refills: 11 | Status: SHIPPED | OUTPATIENT
Start: 2025-08-22

## 2025-08-22 RX ORDER — TOPIRAMATE 50 MG/1
50 TABLET, FILM COATED ORAL 2 TIMES DAILY
Qty: 75 TABLET | Refills: 11 | Status: SHIPPED | OUTPATIENT
Start: 2025-08-22

## 2025-08-22 RX ORDER — BACLOFEN 10 MG/1
10 TABLET ORAL 4 TIMES DAILY
Qty: 120 TABLET | Refills: 11 | Status: SHIPPED | OUTPATIENT
Start: 2025-08-22

## 2025-08-22 RX ORDER — ELETRIPTAN HYDROBROMIDE 20 MG/1
20 TABLET, FILM COATED ORAL ONCE AS NEEDED
COMMUNITY
Start: 2025-07-28

## 2025-08-22 RX ORDER — FENTANYL 25 UG/1
1 PATCH TRANSDERMAL
Qty: 10 PATCH | Refills: 0 | Status: SHIPPED | OUTPATIENT
Start: 2025-10-21

## 2025-08-22 RX ORDER — FENTANYL 25 UG/1
1 PATCH TRANSDERMAL
Qty: 10 PATCH | Refills: 0 | Status: SHIPPED | OUTPATIENT
Start: 2025-08-22

## 2025-08-22 ASSESSMENT — PAIN DESCRIPTION - DESCRIPTORS: DESCRIPTORS: BURNING;SPASM

## 2025-08-22 ASSESSMENT — PAIN SCALES - GENERAL: PAINLEVEL_OUTOF10: 6

## 2025-08-22 ASSESSMENT — PAIN - FUNCTIONAL ASSESSMENT: PAIN_FUNCTIONAL_ASSESSMENT: 0-10

## 2025-08-29 LAB — QUEST FLEXITEST1 RESULTS:: NORMAL

## 2025-11-19 ENCOUNTER — APPOINTMENT (OUTPATIENT)
Dept: PAIN MEDICINE | Facility: CLINIC | Age: 60
End: 2025-11-19
Payer: COMMERCIAL